# Patient Record
Sex: FEMALE | Race: WHITE | NOT HISPANIC OR LATINO | URBAN - METROPOLITAN AREA
[De-identification: names, ages, dates, MRNs, and addresses within clinical notes are randomized per-mention and may not be internally consistent; named-entity substitution may affect disease eponyms.]

---

## 2022-04-12 ENCOUNTER — INPATIENT (INPATIENT)
Facility: HOSPITAL | Age: 77
LOS: 1 days | Discharge: HOME | DRG: 384 | End: 2022-04-14
Attending: STUDENT IN AN ORGANIZED HEALTH CARE EDUCATION/TRAINING PROGRAM | Admitting: STUDENT IN AN ORGANIZED HEALTH CARE EDUCATION/TRAINING PROGRAM
Payer: MEDICARE

## 2022-04-12 VITALS
OXYGEN SATURATION: 97 % | DIASTOLIC BLOOD PRESSURE: 70 MMHG | WEIGHT: 139.99 LBS | TEMPERATURE: 98 F | RESPIRATION RATE: 16 BRPM | HEIGHT: 68 IN | HEART RATE: 108 BPM | SYSTOLIC BLOOD PRESSURE: 153 MMHG

## 2022-04-12 DIAGNOSIS — K92.2 GASTROINTESTINAL HEMORRHAGE, UNSPECIFIED: ICD-10-CM

## 2022-04-12 DIAGNOSIS — E03.9 HYPOTHYROIDISM, UNSPECIFIED: ICD-10-CM

## 2022-04-12 DIAGNOSIS — C50.919 MALIGNANT NEOPLASM OF UNSPECIFIED SITE OF UNSPECIFIED FEMALE BREAST: ICD-10-CM

## 2022-04-12 DIAGNOSIS — F41.9 ANXIETY DISORDER, UNSPECIFIED: ICD-10-CM

## 2022-04-12 DIAGNOSIS — D72.829 ELEVATED WHITE BLOOD CELL COUNT, UNSPECIFIED: ICD-10-CM

## 2022-04-12 DIAGNOSIS — N17.9 ACUTE KIDNEY FAILURE, UNSPECIFIED: ICD-10-CM

## 2022-04-12 DIAGNOSIS — I63.9 CEREBRAL INFARCTION, UNSPECIFIED: ICD-10-CM

## 2022-04-12 DIAGNOSIS — D64.9 ANEMIA, UNSPECIFIED: ICD-10-CM

## 2022-04-12 DIAGNOSIS — I10 ESSENTIAL (PRIMARY) HYPERTENSION: ICD-10-CM

## 2022-04-12 DIAGNOSIS — R63.8 OTHER SYMPTOMS AND SIGNS CONCERNING FOOD AND FLUID INTAKE: ICD-10-CM

## 2022-04-12 LAB
ALBUMIN SERPL ELPH-MCNC: 4 G/DL — SIGNIFICANT CHANGE UP (ref 3.3–5)
ALP SERPL-CCNC: 83 U/L — SIGNIFICANT CHANGE UP (ref 40–120)
ALT FLD-CCNC: 23 U/L — SIGNIFICANT CHANGE UP (ref 10–45)
ANION GAP SERPL CALC-SCNC: 11 MMOL/L — SIGNIFICANT CHANGE UP (ref 5–17)
APPEARANCE UR: CLEAR — SIGNIFICANT CHANGE UP
APTT BLD: 23.6 SEC — LOW (ref 27.5–35.5)
AST SERPL-CCNC: 19 U/L — SIGNIFICANT CHANGE UP (ref 10–40)
BACTERIA # UR AUTO: SIGNIFICANT CHANGE UP /HPF
BASOPHILS # BLD AUTO: 0.04 K/UL — SIGNIFICANT CHANGE UP (ref 0–0.2)
BASOPHILS NFR BLD AUTO: 0.3 % — SIGNIFICANT CHANGE UP (ref 0–2)
BILIRUB SERPL-MCNC: 0.2 MG/DL — SIGNIFICANT CHANGE UP (ref 0.2–1.2)
BILIRUB UR-MCNC: NEGATIVE — SIGNIFICANT CHANGE UP
BLD GP AB SCN SERPL QL: NEGATIVE — SIGNIFICANT CHANGE UP
BUN SERPL-MCNC: 38 MG/DL — HIGH (ref 7–23)
CALCIUM SERPL-MCNC: 9 MG/DL — SIGNIFICANT CHANGE UP (ref 8.4–10.5)
CHLORIDE SERPL-SCNC: 107 MMOL/L — SIGNIFICANT CHANGE UP (ref 96–108)
CO2 SERPL-SCNC: 23 MMOL/L — SIGNIFICANT CHANGE UP (ref 22–31)
COLOR SPEC: YELLOW — SIGNIFICANT CHANGE UP
COMMENT - URINE: SIGNIFICANT CHANGE UP
CREAT SERPL-MCNC: 1.37 MG/DL — HIGH (ref 0.5–1.3)
DIFF PNL FLD: ABNORMAL
EGFR: 40 ML/MIN/1.73M2 — LOW
EOSINOPHIL # BLD AUTO: 0.01 K/UL — SIGNIFICANT CHANGE UP (ref 0–0.5)
EOSINOPHIL NFR BLD AUTO: 0.1 % — SIGNIFICANT CHANGE UP (ref 0–6)
EPI CELLS # UR: SIGNIFICANT CHANGE UP /HPF (ref 0–5)
FERRITIN SERPL-MCNC: 41 NG/ML — SIGNIFICANT CHANGE UP (ref 15–150)
GLUCOSE SERPL-MCNC: 124 MG/DL — HIGH (ref 70–99)
GLUCOSE UR QL: NEGATIVE — SIGNIFICANT CHANGE UP
HCT VFR BLD CALC: 25.4 % — LOW (ref 34.5–45)
HGB BLD-MCNC: 8 G/DL — LOW (ref 11.5–15.5)
IMM GRANULOCYTES NFR BLD AUTO: 0.5 % — SIGNIFICANT CHANGE UP (ref 0–1.5)
INR BLD: 0.97 — SIGNIFICANT CHANGE UP (ref 0.88–1.16)
IRON SATN MFR SERPL: 10 % — LOW (ref 14–50)
IRON SATN MFR SERPL: 30 UG/DL — SIGNIFICANT CHANGE UP (ref 30–160)
KETONES UR-MCNC: NEGATIVE — SIGNIFICANT CHANGE UP
LEUKOCYTE ESTERASE UR-ACNC: ABNORMAL
LYMPHOCYTES # BLD AUTO: 1.9 K/UL — SIGNIFICANT CHANGE UP (ref 1–3.3)
LYMPHOCYTES # BLD AUTO: 12.6 % — LOW (ref 13–44)
MCHC RBC-ENTMCNC: 29.5 PG — SIGNIFICANT CHANGE UP (ref 27–34)
MCHC RBC-ENTMCNC: 31.5 GM/DL — LOW (ref 32–36)
MCV RBC AUTO: 93.7 FL — SIGNIFICANT CHANGE UP (ref 80–100)
MONOCYTES # BLD AUTO: 1.01 K/UL — HIGH (ref 0–0.9)
MONOCYTES NFR BLD AUTO: 6.7 % — SIGNIFICANT CHANGE UP (ref 2–14)
NEUTROPHILS # BLD AUTO: 12.08 K/UL — HIGH (ref 1.8–7.4)
NEUTROPHILS NFR BLD AUTO: 79.8 % — HIGH (ref 43–77)
NITRITE UR-MCNC: NEGATIVE — SIGNIFICANT CHANGE UP
NRBC # BLD: 0 /100 WBCS — SIGNIFICANT CHANGE UP (ref 0–0)
OB PNL STL: POSITIVE
PH UR: 6 — SIGNIFICANT CHANGE UP (ref 5–8)
PLATELET # BLD AUTO: 327 K/UL — SIGNIFICANT CHANGE UP (ref 150–400)
POTASSIUM SERPL-MCNC: 4.1 MMOL/L — SIGNIFICANT CHANGE UP (ref 3.5–5.3)
POTASSIUM SERPL-SCNC: 4.1 MMOL/L — SIGNIFICANT CHANGE UP (ref 3.5–5.3)
PROT SERPL-MCNC: 6.5 G/DL — SIGNIFICANT CHANGE UP (ref 6–8.3)
PROT UR-MCNC: NEGATIVE MG/DL — SIGNIFICANT CHANGE UP
PROTHROM AB SERPL-ACNC: 11.5 SEC — SIGNIFICANT CHANGE UP (ref 10.5–13.4)
RBC # BLD: 2.55 M/UL — LOW (ref 3.8–5.2)
RBC # BLD: 2.71 M/UL — LOW (ref 3.8–5.2)
RBC # FLD: 13.3 % — SIGNIFICANT CHANGE UP (ref 10.3–14.5)
RBC CASTS # UR COMP ASSIST: < 5 /HPF — SIGNIFICANT CHANGE UP
RETICS #: 46.9 K/UL — SIGNIFICANT CHANGE UP (ref 25–125)
RETICS/RBC NFR: 1.8 % — SIGNIFICANT CHANGE UP (ref 0.5–2.5)
RH IG SCN BLD-IMP: NEGATIVE — SIGNIFICANT CHANGE UP
SARS-COV-2 RNA SPEC QL NAA+PROBE: NEGATIVE — SIGNIFICANT CHANGE UP
SODIUM SERPL-SCNC: 141 MMOL/L — SIGNIFICANT CHANGE UP (ref 135–145)
SP GR SPEC: 1.01 — SIGNIFICANT CHANGE UP (ref 1–1.03)
TIBC SERPL-MCNC: 292 UG/DL — SIGNIFICANT CHANGE UP (ref 220–430)
TRANSFERRIN SERPL-MCNC: 244 MG/DL — SIGNIFICANT CHANGE UP (ref 200–360)
UIBC SERPL-MCNC: 262 UG/DL — SIGNIFICANT CHANGE UP (ref 110–370)
UROBILINOGEN FLD QL: 0.2 E.U./DL — SIGNIFICANT CHANGE UP
WBC # BLD: 15.11 K/UL — HIGH (ref 3.8–10.5)
WBC # FLD AUTO: 15.11 K/UL — HIGH (ref 3.8–10.5)
WBC UR QL: ABNORMAL /HPF

## 2022-04-12 PROCEDURE — 99285 EMERGENCY DEPT VISIT HI MDM: CPT | Mod: 25

## 2022-04-12 PROCEDURE — 71045 X-RAY EXAM CHEST 1 VIEW: CPT | Mod: 26

## 2022-04-12 PROCEDURE — 99223 1ST HOSP IP/OBS HIGH 75: CPT | Mod: GC

## 2022-04-12 RX ORDER — PANTOPRAZOLE SODIUM 20 MG/1
80 TABLET, DELAYED RELEASE ORAL ONCE
Refills: 0 | Status: COMPLETED | OUTPATIENT
Start: 2022-04-12 | End: 2022-04-12

## 2022-04-12 RX ORDER — BUPROPION HYDROCHLORIDE 150 MG/1
1 TABLET, EXTENDED RELEASE ORAL
Qty: 0 | Refills: 0 | DISCHARGE

## 2022-04-12 RX ORDER — PANTOPRAZOLE SODIUM 20 MG/1
40 TABLET, DELAYED RELEASE ORAL ONCE
Refills: 0 | Status: DISCONTINUED | OUTPATIENT
Start: 2022-04-12 | End: 2022-04-12

## 2022-04-12 RX ORDER — MIRTAZAPINE 45 MG/1
15 TABLET, ORALLY DISINTEGRATING ORAL DAILY
Refills: 0 | Status: DISCONTINUED | OUTPATIENT
Start: 2022-04-12 | End: 2022-04-14

## 2022-04-12 RX ORDER — PANTOPRAZOLE SODIUM 20 MG/1
8 TABLET, DELAYED RELEASE ORAL
Qty: 80 | Refills: 0 | Status: DISCONTINUED | OUTPATIENT
Start: 2022-04-12 | End: 2022-04-13

## 2022-04-12 RX ORDER — FLUDROCORTISONE ACETATE 0.1 MG/1
0.1 TABLET ORAL DAILY
Refills: 0 | Status: DISCONTINUED | OUTPATIENT
Start: 2022-04-13 | End: 2022-04-14

## 2022-04-12 RX ORDER — ATORVASTATIN CALCIUM 80 MG/1
80 TABLET, FILM COATED ORAL AT BEDTIME
Refills: 0 | Status: DISCONTINUED | OUTPATIENT
Start: 2022-04-12 | End: 2022-04-14

## 2022-04-12 RX ORDER — SODIUM CHLORIDE 9 MG/ML
500 INJECTION INTRAMUSCULAR; INTRAVENOUS; SUBCUTANEOUS ONCE
Refills: 0 | Status: COMPLETED | OUTPATIENT
Start: 2022-04-12 | End: 2022-04-12

## 2022-04-12 RX ORDER — LEVOTHYROXINE SODIUM 125 MCG
100 TABLET ORAL DAILY
Refills: 0 | Status: DISCONTINUED | OUTPATIENT
Start: 2022-04-12 | End: 2022-04-14

## 2022-04-12 RX ORDER — ACETAMINOPHEN 500 MG
650 TABLET ORAL EVERY 6 HOURS
Refills: 0 | Status: DISCONTINUED | OUTPATIENT
Start: 2022-04-12 | End: 2022-04-14

## 2022-04-12 RX ORDER — BUPROPION HYDROCHLORIDE 150 MG/1
300 TABLET, EXTENDED RELEASE ORAL DAILY
Refills: 0 | Status: DISCONTINUED | OUTPATIENT
Start: 2022-04-13 | End: 2022-04-14

## 2022-04-12 RX ADMIN — PANTOPRAZOLE SODIUM 10 MG/HR: 20 TABLET, DELAYED RELEASE ORAL at 21:10

## 2022-04-12 RX ADMIN — PANTOPRAZOLE SODIUM 80 MILLIGRAM(S): 20 TABLET, DELAYED RELEASE ORAL at 20:20

## 2022-04-12 RX ADMIN — SODIUM CHLORIDE 500 MILLILITER(S): 9 INJECTION INTRAMUSCULAR; INTRAVENOUS; SUBCUTANEOUS at 18:58

## 2022-04-12 NOTE — H&P ADULT - NSHPLABSRESULTS_GEN_ALL_CORE
LABS:                         8.0    15.11 )-----------( 327      ( 2022 18:30 )             25.4     04-12    141  |  107  |  38<H>  ----------------------------<  124<H>  4.1   |  23  |  1.37<H>    Ca    9.0      2022 18:31    TPro  6.5  /  Alb  4.0  /  TBili  0.2  /  DBili  x   /  AST  19  /  ALT  23  /  AlkPhos  83  04-12    PT/INR - ( 2022 18:30 )   PT: 11.5 sec;   INR: 0.97          PTT - ( 2022 18:30 )  PTT:23.6 sec  Urinalysis Basic - ( 2022 20:19 )    Color: Yellow / Appearance: Clear / S.010 / pH: x  Gluc: x / Ketone: NEGATIVE  / Bili: Negative / Urobili: 0.2 E.U./dL   Blood: x / Protein: NEGATIVE mg/dL / Nitrite: NEGATIVE   Leuk Esterase: Trace / RBC: x / WBC x   Sq Epi: x / Non Sq Epi: x / Bacteria: x                RADIOLOGY, EKG & ADDITIONAL TESTS: Reviewed.

## 2022-04-12 NOTE — H&P ADULT - NSHPSOCIALHISTORY_GEN_ALL_CORE
...... Heavy EtOH/ tobacco use in the past, quit bother 15+ years ago.   Denies illicit drug use hx   Lives at home w/ fam

## 2022-04-12 NOTE — H&P ADULT - PROBLEM SELECTOR PLAN 8
Home med: mirtazapine 15, Pristiq 50, Donepezil 10, Wellbutrin 150 or 300  -c/w home meds Home med: mirtazapine 15, Pristiq 50, Donepezil 10, Wellbutrin 300  -c/w home meds (Pristiq 50 not formulary will need to bring own med)

## 2022-04-12 NOTE — H&P ADULT - PROBLEM SELECTOR PLAN 7
Home med: Olmesartan 20  -hold ARB in the setting of ALYSHA, resume when can Home med: Olmesartan 20  -hold ARB in the setting of ALYSHA, GIB resume when can

## 2022-04-12 NOTE — H&P ADULT - PROBLEM SELECTOR PLAN 2
Hgb 8, MVC 90 on admission. Baseline Hgb unknown  -see above for plan Normocytic anemia. Hgb 8, MVC 90 on admission. Baseline Hgb unknown  -see above for plan

## 2022-04-12 NOTE — H&P ADULT - NSHPPHYSICALEXAM_GEN_ALL_CORE
VITAL SIGNS:  T(C): 36.6 (04-12-22 @ 20:43), Max: 37.1 (04-12-22 @ 19:10)  T(F): 97.9 (04-12-22 @ 20:43), Max: 98.7 (04-12-22 @ 19:10)  HR: 90 (04-12-22 @ 20:43) (90 - 108)  BP: 115/82 (04-12-22 @ 20:43) (113/70 - 153/70)  BP(mean): --  RR: 16 (04-12-22 @ 20:43) (16 - 16)  SpO2: 98% (04-12-22 @ 20:43) (97% - 98%)  Wt(kg): --    PHYSICAL EXAM:  Constitutional: WDWN resting comfortably in bed; NAD  Head: NC/AT  Eyes: PERRL, EOMI, anicteric sclera  ENT: no nasal discharge; uvula midline, no oropharyngeal erythema or exudates; MMM  Neck: supple; no JVD or thyromegaly  Respiratory: CTA B/L; no W/R/R, no retractions  Cardiac: +S1/S2; RRR; no M/R/G; PMI non-displaced  Gastrointestinal: abdomen soft, NT/ND; no rebound or guarding; +BSx4  Back: spine midline, no bony tenderness or step-offs; no CVAT B/L  Extremities: WWP, no clubbing or cyanosis; no peripheral edema  Musculoskeletal: NROM x4; no joint swelling, tenderness or erythema  Vascular: 2+ radial, femoral, DP/PT pulses B/L  Dermatologic: skin warm, dry and intact; no rashes, wounds, or scars  Lymphatic: no submandibular or cervical LAD  Neurologic: AAOx3; CNII-XII grossly intact; no focal deficits  Psychiatric: affect and characteristics of appearance, verbalizations, behaviors are appropriate VITAL SIGNS:  T(C): 36.6 (04-12-22 @ 20:43), Max: 37.1 (04-12-22 @ 19:10)  T(F): 97.9 (04-12-22 @ 20:43), Max: 98.7 (04-12-22 @ 19:10)  HR: 90 (04-12-22 @ 20:43) (90 - 108)  BP: 115/82 (04-12-22 @ 20:43) (113/70 - 153/70)  BP(mean): --  RR: 16 (04-12-22 @ 20:43) (16 - 16)  SpO2: 98% (04-12-22 @ 20:43) (97% - 98%)  Wt(kg): --    PHYSICAL EXAM:  Constitutional: NAD, pale   ENT: MMM  Respiratory: CTA B/L; no W/R/R   Cardiac: +S1/S2; RRR; no M/R/G   Gastrointestinal: abdomen soft, NT/ND; no rebound or guarding; +BSx4  Extremities: WWP, no clubbing or cyanosis; no peripheral edema  Vascular: 2+ radial, femoral, DP/PT pulses B/L  Neurologic: AAOx3; no focal deficits

## 2022-04-12 NOTE — H&P ADULT - ATTENDING COMMENTS
#Anemia: hgb 8 on admission, baseline 12. Denies symptoms of bleeded. Rectal exam w/ brown stool, however FOBT+. Currently hds, f/up orthostatics, lactate. GI consulted in ED, recc ppi ggt, npo for now, 2 large bore ivs, f.up iron panel

## 2022-04-12 NOTE — H&P ADULT - ASSESSMENT
76F PMH CVA (no residual deficits hypothyroidism, HTN, HLD, Anxiety/Depression and breast CA (s/p lumpectomy 15 years ago, not on rx)  presents from outpt  service ([facility]) for evaluation of anemia, ALYSHA in the setting of possible GI bleed.

## 2022-04-12 NOTE — ED PROVIDER NOTE - OBJECTIVE STATEMENT
77 yo PMhx of CVA, hypothyroidism, hdl, breast CA w complaints of 2 days 75 yo PMhx of CVA, hypothyroidism, hdl, breast CA w complaints of 2 days of fatigue, weakness, n/v. Went to outpt  service that completed labs, CT showing evidence of anemia, PUD/gastritis and referred to ED. Pt currently on asa/plavix, denies sob, chest pain, leg swelling, GI bleed sx. Last colonoscopy 5 years ago normal, never had endoscopy. No cough, f/c, abd pain. Prior heavy cigarette use/etoh use. PT denies current/recent etoh use.

## 2022-04-12 NOTE — ED ADULT TRIAGE NOTE - CHIEF COMPLAINT QUOTE
Pt brought in From [place] for possible GI bleed. Pt reports abdominal pain, nausea, and vomiting x2 days. As per patient, "they took a sample and said there is blood in my stool." Denies fevers, chills.

## 2022-04-12 NOTE — H&P ADULT - PROBLEM SELECTOR PLAN 3
WBC 15. CXR-, UA -. Most likely refractive in the setting of GIB   -c/t trend   -no abx at this time WBC 15. CXR-, UA -. Most likely refractive in the setting of possible slow GIB and/or recent viral gastroenteritis (pt w/ NBNB diarrhea/emesis X 2 days, that self resolved, last episodes 4/11 am)    -c/t trend   -no abx at this time

## 2022-04-12 NOTE — H&P ADULT - PROBLEM SELECTOR PLAN 4
Cr 1.37. Baseline Cr unknown. Most likely prerenal in the setting of slow GI bleed, and n/v  -f/u urine lytes   -avoid nephrotoxins, renally dose meds Cr 1.37. Baseline Cr unknown. Most likely prerenal in the setting of slow GI bleed and/or recent viral gastroenteritis (pt w/ NBNB diarrhea/emesis X 2 days, that self resolved, last episodes 4/11 am)    -f/u urine lytes   -avoid nephrotoxins, renally dose meds

## 2022-04-12 NOTE — H&P ADULT - NSICDXPASTMEDICALHX_GEN_ALL_CORE_FT
PAST MEDICAL HISTORY:  Breast CA     Cerebrovascular accident (CVA)     Depressive disorder Depression    HLD (hyperlipidemia)     Hypothyroidism Hypothyroidism

## 2022-04-12 NOTE — ED PROVIDER NOTE - PHYSICAL EXAMINATION

## 2022-04-12 NOTE — H&P ADULT - HISTORY OF PRESENT ILLNESS
76F PMH CVA, hypothyroidism, HTN, HLD, Anxiety/Depression and breast CA (not on rx)  presents c/o complaints of 2 days of fatigue, weakness, n/v. Went to outpt  service that completed labs, CT showing evidence of anemia, PUD/gastritis and referred to ED. Pt currently on asa/plavix, denies sob, chest pain, leg swelling, GI bleed sx. Last colonoscopy 5 years ago normal, never had endoscopy. No cough, f/c, abd pain. Prior heavy cigarette use/etoh use. PT denies current/recent etoh use    ED Course:   VS: 98.3, 109, 153/70, 16 98 RA  Labs: WBC 15, Hgb 8, MCV 93, BUN 38, Cr 1.37, COVID -  Imaging: CXR: No acute pathology; EKG: Sinus Tach, QTc 452  Rx: Protonix IV 80,  76F PMH CVA (no residual deficits hypothyroidism, HTN, HLD, Anxiety/Depression and breast CA (s/p lumpectomy 15 years ago, not on rx)  presents from outpt  service ([facility]) for evaluation of anemia, ALYSHA in the setting of possible GI bleed. Pt reports fatigue, weakness, n/v/d X 2 days. Had 2-3 episodes of NBNB diarrhea and NBNB emesis X 2 days that have self resolved now, last episode of diarrhea/emesis was on 4/11 am. Does not recall eating anything unusual, sick contacts and recent travel hx. Pt went [place] [facility] [place] of weakness/ fatigue w/ labs reveling anemia, +FOBT and ? CT A/P (pt not sure of findings)  and was sent to ED for further eval. Denies hx of GIB requiring transfusions.  Pt currently on ASA/plavix, denies melena, hematemesis, hematochezia, BRBPR, sob, chest pain, f/c, abd pain and dysuria. Last colonoscopy 5 years ago normal, never had endoscopy. Denies current NSIAD, EtOH and tobacco use. Used to be a heavy cigarette / EtoH user in the past, reports abstinence for 15+ years now. Reports last screenings were done 3 years ago and were normal at that time. Denies any recent weight lose, f/c, changes in bowel/ bladder habits and night sweats.     ED Course:   VS: 98.3, 109, 153/70, 16 98 RA  Labs: WBC 15, Hgb 8, MCV 93, BUN 38, Cr 1.37, COVID -  Imaging: CXR: No acute pathology; EKG: Sinus Tach, QTc 452  Rx: Protonix IV 80,

## 2022-04-12 NOTE — ED ADULT NURSE NOTE - OBJECTIVE STATEMENT
Pt AOX4. Pt n/v x2 days. Pt states "I was at [employer] and they said there was blood in my stool sample and referred me to the ER for possible GI bleed". Pt denies fevers, chills, SOB, chest pain, abdominal pain, hematuria, dysuria. Endorses decreased appetite. Pt speaking in full complete sentences. Respirations even and unlabored. Pt ambulates steadily.

## 2022-04-12 NOTE — ED ADULT NURSE NOTE - CHIEF COMPLAINT QUOTE
Pt brought in From [place] for possible GI bleed. Pt reports abdominal pain, nausea, and vomiting x2 days. As per patient, "they took a sample and said there is blood in my stool." Denies fevers, chills.

## 2022-04-12 NOTE — ED ADULT NURSE NOTE - CAS EDP DISCH DISPOSITION ADMI
er holding pending bed/MedSurg er holding pending [facility][id number][facility]

## 2022-04-12 NOTE — H&P ADULT - PROBLEM SELECTOR PLAN 1
-Sinus Tach 110s, BP stable   -Hgb 8, MCV 93, BUN 38, FOBT +  -GI consulted in the ED    Plan:   -PPI gtt   -Active TandS, transfuse for Hgb <7   -f/u Iron panel, Folate, B12, Retic count  -NPO for possible procedure   -f/u GI recs Pt went to [facility] [place] today for evaluation of weakness/ fatigue w/ labs reveling anemia, +FOBT and ? CT A/P fining of bleed (pt not sure of findings)  and was sent to ED for further eval. Denies hx of GIB requiring transfusions.  Pt currently on ASA/plavix, denies melena, hematemesis, hematochezia, BRBPR. Last colonoscopy 5 years ago normal, never had endoscopy. Denies current NSIAD, EtOH and tobacco use. Not UTD on screenings (pt w/ hx of Breast CA, s/p Lumpectomy 15 years ago). Denies B symptoms.   -Sinus Tach 110s, BP stable   -Hgb 8, MCV 93, BUN 38, FOBT +  -GI consulted in the ED  DDX: Slow GI bleed (PUD, gastritis, esophagitis) vs Malignancy vs Nutritional def vs Bone Marrow suppression      Plan:   -PPI gtt   -Active TandS, transfuse for Hgb <7   -f/u Iron panel, Folate, B12, Retic count  -NPO for possible procedure   -f/u GI recs Pt went to [facility] [place] evaluation of weakness/ fatigue w/ labs reveling anemia, +FOBT and ? CT A/P fining of bleed (pt not sure of findings)  and was sent to ED for further eval. Denies hx of GIB requiring transfusions.  Pt currently on ASA/plavix, denies melena, hematemesis, hematochezia, BRBPR. Last colonoscopy 5 years ago normal, never had endoscopy. Denies current NSIAD, EtOH and tobacco use. Not UTD on screenings (pt w/ hx of Breast CA, s/p Lumpectomy 15 years ago). Denies B symptoms.   -Sinus Tach 110s, BP stable   -Hgb 8, MCV 93, BUN 38, FOBT +  -GI consulted in the ED  DDX: Slow GI bleed (PUD, gastritis, esophagitis) vs Malignancy vs Nutritional def vs Bone Marrow suppression      Plan:   -Maintain 2 large bore IVs  -PPI gtt   -Active TandS, transfuse for Hgb <7   -f/u Iron panel, Folate, B12, Retic count  -NPO for possible procedure   -f/u GI recs

## 2022-04-12 NOTE — ED ADULT NURSE REASSESSMENT NOTE - NS ED NURSE REASSESS COMMENT FT1
Received report from TADEO Napier. Received patient in stretcher. AOX4. VSS.  Patient denies chest pain, pain, discomfort, shortness of breath, difficulty breathing and any form of distress not noted. Patient oriented to ED area. Plan of care discussed and verbalized understanding. All needs attended. Fall risk precautions maintained. Purposeful proactive hourly rounding in progress.

## 2022-04-12 NOTE — ED PROVIDER NOTE - CLINICAL SUMMARY MEDICAL DECISION MAKING FREE TEXT BOX
Pt w complaints of fatigue, weakness, noted anemia/elev wbc, ulcers on outpt CT  Ddx: slow upper GI bleed, pna/uri/uti/dehydration  -labs, ivf, cxr, UA  Disc w  GI fellow, possible scope, outpt records reviewed in chart

## 2022-04-12 NOTE — H&P ADULT - PROBLEM SELECTOR PLAN 6
No residual deficits. Home med: ASA, Atorvastatin 80, Plavix 75  -hold ASA in the setting of GIB and possible scoping in am   -c/w Plavix and Statin No residual deficits. Home med: ASA, Atorvastatin 80, Plavix 75  -hold ASA, Plavix in the setting of GIB and possible scoping in am   -c/w Statin

## 2022-04-13 ENCOUNTER — RESULT REVIEW (OUTPATIENT)
Age: 77
End: 2022-04-13

## 2022-04-13 ENCOUNTER — TRANSCRIPTION ENCOUNTER (OUTPATIENT)
Age: 77
End: 2022-04-13

## 2022-04-13 LAB
ALBUMIN SERPL ELPH-MCNC: 3.7 G/DL — SIGNIFICANT CHANGE UP (ref 3.3–5)
ALP SERPL-CCNC: 82 U/L — SIGNIFICANT CHANGE UP (ref 40–120)
ALT FLD-CCNC: 21 U/L — SIGNIFICANT CHANGE UP (ref 10–45)
ANION GAP SERPL CALC-SCNC: 10 MMOL/L — SIGNIFICANT CHANGE UP (ref 5–17)
ANION GAP SERPL CALC-SCNC: 6 MMOL/L — SIGNIFICANT CHANGE UP (ref 5–17)
AST SERPL-CCNC: 21 U/L — SIGNIFICANT CHANGE UP (ref 10–40)
BASOPHILS # BLD AUTO: 0.04 K/UL — SIGNIFICANT CHANGE UP (ref 0–0.2)
BASOPHILS NFR BLD AUTO: 0.4 % — SIGNIFICANT CHANGE UP (ref 0–2)
BILIRUB SERPL-MCNC: 0.3 MG/DL — SIGNIFICANT CHANGE UP (ref 0.2–1.2)
BLD GP AB SCN SERPL QL: NEGATIVE — SIGNIFICANT CHANGE UP
BUN SERPL-MCNC: 24 MG/DL — HIGH (ref 7–23)
BUN SERPL-MCNC: 29 MG/DL — HIGH (ref 7–23)
CALCIUM SERPL-MCNC: 8.2 MG/DL — LOW (ref 8.4–10.5)
CALCIUM SERPL-MCNC: 8.8 MG/DL — SIGNIFICANT CHANGE UP (ref 8.4–10.5)
CHLORIDE SERPL-SCNC: 108 MMOL/L — SIGNIFICANT CHANGE UP (ref 96–108)
CHLORIDE SERPL-SCNC: 110 MMOL/L — HIGH (ref 96–108)
CO2 SERPL-SCNC: 22 MMOL/L — SIGNIFICANT CHANGE UP (ref 22–31)
CO2 SERPL-SCNC: 28 MMOL/L — SIGNIFICANT CHANGE UP (ref 22–31)
CREAT ?TM UR-MCNC: 104 MG/DL — SIGNIFICANT CHANGE UP
CREAT SERPL-MCNC: 1.35 MG/DL — HIGH (ref 0.5–1.3)
CREAT SERPL-MCNC: 1.51 MG/DL — HIGH (ref 0.5–1.3)
EGFR: 36 ML/MIN/1.73M2 — LOW
EGFR: 41 ML/MIN/1.73M2 — LOW
EOSINOPHIL # BLD AUTO: 0.08 K/UL — SIGNIFICANT CHANGE UP (ref 0–0.5)
EOSINOPHIL NFR BLD AUTO: 0.8 % — SIGNIFICANT CHANGE UP (ref 0–6)
FOLATE SERPL-MCNC: 4.3 NG/ML — LOW
GLUCOSE SERPL-MCNC: 103 MG/DL — HIGH (ref 70–99)
GLUCOSE SERPL-MCNC: 98 MG/DL — SIGNIFICANT CHANGE UP (ref 70–99)
HAPTOGLOB SERPL-MCNC: 157 MG/DL — SIGNIFICANT CHANGE UP (ref 34–200)
HCT VFR BLD CALC: 22.2 % — LOW (ref 34.5–45)
HCT VFR BLD CALC: 22.8 % — LOW (ref 34.5–45)
HCT VFR BLD CALC: 25 % — LOW (ref 34.5–45)
HCV AB S/CO SERPL IA: 0.04 S/CO — SIGNIFICANT CHANGE UP
HCV AB SERPL-IMP: SIGNIFICANT CHANGE UP
HGB BLD-MCNC: 6.4 G/DL — CRITICAL LOW (ref 11.5–15.5)
HGB BLD-MCNC: 7.1 G/DL — LOW (ref 11.5–15.5)
HGB BLD-MCNC: 7.6 G/DL — LOW (ref 11.5–15.5)
IMM GRANULOCYTES NFR BLD AUTO: 0.3 % — SIGNIFICANT CHANGE UP (ref 0–1.5)
LDH SERPL L TO P-CCNC: 167 U/L — SIGNIFICANT CHANGE UP (ref 50–242)
LYMPHOCYTES # BLD AUTO: 2.39 K/UL — SIGNIFICANT CHANGE UP (ref 1–3.3)
LYMPHOCYTES # BLD AUTO: 25.3 % — SIGNIFICANT CHANGE UP (ref 13–44)
MAGNESIUM SERPL-MCNC: 1.7 MG/DL — SIGNIFICANT CHANGE UP (ref 1.6–2.6)
MCHC RBC-ENTMCNC: 28.8 GM/DL — LOW (ref 32–36)
MCHC RBC-ENTMCNC: 28.8 PG — SIGNIFICANT CHANGE UP (ref 27–34)
MCHC RBC-ENTMCNC: 29.2 PG — SIGNIFICANT CHANGE UP (ref 27–34)
MCHC RBC-ENTMCNC: 29.3 PG — SIGNIFICANT CHANGE UP (ref 27–34)
MCHC RBC-ENTMCNC: 30.4 GM/DL — LOW (ref 32–36)
MCHC RBC-ENTMCNC: 31.1 GM/DL — LOW (ref 32–36)
MCV RBC AUTO: 100 FL — SIGNIFICANT CHANGE UP (ref 80–100)
MCV RBC AUTO: 94.2 FL — SIGNIFICANT CHANGE UP (ref 80–100)
MCV RBC AUTO: 96.2 FL — SIGNIFICANT CHANGE UP (ref 80–100)
MONOCYTES # BLD AUTO: 1 K/UL — HIGH (ref 0–0.9)
MONOCYTES NFR BLD AUTO: 10.6 % — SIGNIFICANT CHANGE UP (ref 2–14)
NEUTROPHILS # BLD AUTO: 5.91 K/UL — SIGNIFICANT CHANGE UP (ref 1.8–7.4)
NEUTROPHILS NFR BLD AUTO: 62.6 % — SIGNIFICANT CHANGE UP (ref 43–77)
NRBC # BLD: 0 /100 WBCS — SIGNIFICANT CHANGE UP (ref 0–0)
OSMOLALITY UR: 609 MOSM/KG — SIGNIFICANT CHANGE UP (ref 300–900)
PHOSPHATE SERPL-MCNC: 3.3 MG/DL — SIGNIFICANT CHANGE UP (ref 2.5–4.5)
PLATELET # BLD AUTO: 205 K/UL — SIGNIFICANT CHANGE UP (ref 150–400)
PLATELET # BLD AUTO: 269 K/UL — SIGNIFICANT CHANGE UP (ref 150–400)
PLATELET # BLD AUTO: 292 K/UL — SIGNIFICANT CHANGE UP (ref 150–400)
POTASSIUM SERPL-MCNC: 4.4 MMOL/L — SIGNIFICANT CHANGE UP (ref 3.5–5.3)
POTASSIUM SERPL-MCNC: 4.7 MMOL/L — SIGNIFICANT CHANGE UP (ref 3.5–5.3)
POTASSIUM SERPL-SCNC: 4.4 MMOL/L — SIGNIFICANT CHANGE UP (ref 3.5–5.3)
POTASSIUM SERPL-SCNC: 4.7 MMOL/L — SIGNIFICANT CHANGE UP (ref 3.5–5.3)
PROT SERPL-MCNC: 5.9 G/DL — LOW (ref 6–8.3)
RBC # BLD: 2.22 M/UL — LOW (ref 3.8–5.2)
RBC # BLD: 2.42 M/UL — LOW (ref 3.8–5.2)
RBC # BLD: 2.6 M/UL — LOW (ref 3.8–5.2)
RBC # FLD: 13.2 % — SIGNIFICANT CHANGE UP (ref 10.3–14.5)
RBC # FLD: 13.4 % — SIGNIFICANT CHANGE UP (ref 10.3–14.5)
RBC # FLD: 13.5 % — SIGNIFICANT CHANGE UP (ref 10.3–14.5)
RH IG SCN BLD-IMP: NEGATIVE — SIGNIFICANT CHANGE UP
SODIUM SERPL-SCNC: 142 MMOL/L — SIGNIFICANT CHANGE UP (ref 135–145)
SODIUM SERPL-SCNC: 142 MMOL/L — SIGNIFICANT CHANGE UP (ref 135–145)
SODIUM UR-SCNC: 79 MMOL/L — SIGNIFICANT CHANGE UP
T4 FREE SERPL-MCNC: 0.97 NG/DL — SIGNIFICANT CHANGE UP (ref 0.93–1.7)
TSH SERPL-MCNC: 4.4 UIU/ML — HIGH (ref 0.27–4.2)
VIT B12 SERPL-MCNC: 827 PG/ML — SIGNIFICANT CHANGE UP (ref 232–1245)
WBC # BLD: 12.67 K/UL — HIGH (ref 3.8–10.5)
WBC # BLD: 7.51 K/UL — SIGNIFICANT CHANGE UP (ref 3.8–10.5)
WBC # BLD: 9.45 K/UL — SIGNIFICANT CHANGE UP (ref 3.8–10.5)
WBC # FLD AUTO: 12.67 K/UL — HIGH (ref 3.8–10.5)
WBC # FLD AUTO: 7.51 K/UL — SIGNIFICANT CHANGE UP (ref 3.8–10.5)
WBC # FLD AUTO: 9.45 K/UL — SIGNIFICANT CHANGE UP (ref 3.8–10.5)

## 2022-04-13 PROCEDURE — 88305 TISSUE EXAM BY PATHOLOGIST: CPT | Mod: 26

## 2022-04-13 PROCEDURE — 74177 CT ABD & PELVIS W/CONTRAST: CPT | Mod: 26

## 2022-04-13 PROCEDURE — 88342 IMHCHEM/IMCYTCHM 1ST ANTB: CPT | Mod: 26

## 2022-04-13 PROCEDURE — 43239 EGD BIOPSY SINGLE/MULTIPLE: CPT

## 2022-04-13 PROCEDURE — 88341 IMHCHEM/IMCYTCHM EA ADD ANTB: CPT | Mod: 26

## 2022-04-13 PROCEDURE — 99222 1ST HOSP IP/OBS MODERATE 55: CPT | Mod: 25

## 2022-04-13 RX ORDER — DONEPEZIL HYDROCHLORIDE 10 MG/1
5 TABLET, FILM COATED ORAL AT BEDTIME
Refills: 0 | Status: DISCONTINUED | OUTPATIENT
Start: 2022-04-13 | End: 2022-04-14

## 2022-04-13 RX ORDER — ACETAMINOPHEN 500 MG
650 TABLET ORAL ONCE
Refills: 0 | Status: DISCONTINUED | OUTPATIENT
Start: 2022-04-13 | End: 2022-04-13

## 2022-04-13 RX ORDER — POLYETHYLENE GLYCOL 3350 17 G/17G
17 POWDER, FOR SOLUTION ORAL
Refills: 0 | Status: DISCONTINUED | OUTPATIENT
Start: 2022-04-13 | End: 2022-04-13

## 2022-04-13 RX ORDER — SODIUM CHLORIDE 9 MG/ML
1000 INJECTION INTRAMUSCULAR; INTRAVENOUS; SUBCUTANEOUS
Refills: 0 | Status: DISCONTINUED | OUTPATIENT
Start: 2022-04-13 | End: 2022-04-14

## 2022-04-13 RX ORDER — LANOLIN ALCOHOL/MO/W.PET/CERES
3 CREAM (GRAM) TOPICAL ONCE
Refills: 0 | Status: DISCONTINUED | OUTPATIENT
Start: 2022-04-13 | End: 2022-04-13

## 2022-04-13 RX ORDER — PANTOPRAZOLE SODIUM 20 MG/1
40 TABLET, DELAYED RELEASE ORAL EVERY 12 HOURS
Refills: 0 | Status: DISCONTINUED | OUTPATIENT
Start: 2022-04-13 | End: 2022-04-14

## 2022-04-13 RX ORDER — MAGNESIUM SULFATE 500 MG/ML
2 VIAL (ML) INJECTION ONCE
Refills: 0 | Status: COMPLETED | OUTPATIENT
Start: 2022-04-13 | End: 2022-04-13

## 2022-04-13 RX ORDER — FOLIC ACID 0.8 MG
1 TABLET ORAL DAILY
Refills: 0 | Status: DISCONTINUED | OUTPATIENT
Start: 2022-04-13 | End: 2022-04-14

## 2022-04-13 RX ORDER — DESVENLAFAXINE 50 MG/1
50 TABLET, EXTENDED RELEASE ORAL EVERY 24 HOURS
Refills: 0 | Status: DISCONTINUED | OUTPATIENT
Start: 2022-04-13 | End: 2022-04-14

## 2022-04-13 RX ORDER — IOHEXOL 300 MG/ML
30 INJECTION, SOLUTION INTRAVENOUS ONCE
Refills: 0 | Status: COMPLETED | OUTPATIENT
Start: 2022-04-13 | End: 2022-04-13

## 2022-04-13 RX ADMIN — SODIUM CHLORIDE 100 MILLILITER(S): 9 INJECTION INTRAMUSCULAR; INTRAVENOUS; SUBCUTANEOUS at 07:08

## 2022-04-13 RX ADMIN — PANTOPRAZOLE SODIUM 40 MILLIGRAM(S): 20 TABLET, DELAYED RELEASE ORAL at 14:29

## 2022-04-13 RX ADMIN — FLUDROCORTISONE ACETATE 0.1 MILLIGRAM(S): 0.1 TABLET ORAL at 05:51

## 2022-04-13 RX ADMIN — MIRTAZAPINE 15 MILLIGRAM(S): 45 TABLET, ORALLY DISINTEGRATING ORAL at 14:30

## 2022-04-13 RX ADMIN — PANTOPRAZOLE SODIUM 10 MG/HR: 20 TABLET, DELAYED RELEASE ORAL at 08:01

## 2022-04-13 RX ADMIN — PANTOPRAZOLE SODIUM 40 MILLIGRAM(S): 20 TABLET, DELAYED RELEASE ORAL at 23:28

## 2022-04-13 RX ADMIN — Medication 100 MICROGRAM(S): at 05:51

## 2022-04-13 RX ADMIN — Medication 25 GRAM(S): at 14:31

## 2022-04-13 RX ADMIN — IOHEXOL 30 MILLILITER(S): 300 INJECTION, SOLUTION INTRAVENOUS at 21:00

## 2022-04-13 RX ADMIN — BUPROPION HYDROCHLORIDE 300 MILLIGRAM(S): 150 TABLET, EXTENDED RELEASE ORAL at 14:30

## 2022-04-13 RX ADMIN — DONEPEZIL HYDROCHLORIDE 5 MILLIGRAM(S): 10 TABLET, FILM COATED ORAL at 23:28

## 2022-04-13 NOTE — PROGRESS NOTE ADULT - ASSESSMENT
76F PMH CVA (no residual deficits hypothyroidism, HTN, HLD, Anxiety/Depression and breast CA (s/p lumpectomy 15 years ago, not on rx)  presents from outpt  service ([facility]) for evaluation of anemia, ALYSHA in the setting of possible GI bleed.

## 2022-04-13 NOTE — PROGRESS NOTE ADULT - PROBLEM SELECTOR PLAN 4
Cr 1.37. Baseline Cr unknown. Most likely prerenal in the setting of slow GI bleed and/or recent viral gastroenteritis (pt w/ NBNB diarrhea/emesis X 2 days, that self resolved, last episodes 4/11 am)    -f/u urine lytes   -avoid nephrotoxins, renally dose meds WBC 15. CXR-, UA -. Most likely refractive in the setting of possible slow GIB and/or recent viral gastroenteritis (pt w/ NBNB diarrhea/emesis X 2 days, that self resolved, last episodes 4/11 am) now resolved.  -c/t trend   -no abx at this time

## 2022-04-13 NOTE — PROGRESS NOTE ADULT - PROBLEM SELECTOR PLAN 8
Home med: mirtazapine 15, Pristiq 50, Donepezil 10, Wellbutrin 300  -c/w home meds (Pristiq 50 not formulary will need to bring own med)

## 2022-04-13 NOTE — PROGRESS NOTE ADULT - PROBLEM SELECTOR PLAN 1
Presents from outpatient provider w fatigue after 2 days of N/V diarrhea, found to have hgb 8, did CT showing mucosal inflamation. Denies hx of GIB requiring transfusions.  Pt currently on ASA/plavix, denies melena, hematemesis, hematochezia, BRBPR. Last colonoscopy 5 years ago normal, never had endoscopy. Denies current NSIAD, EtOH and tobacco use. Not UTD on screenings (pt w/ hx of Breast CA, s/p Lumpectomy 15 years ago). Denies B symptoms.   -Sinus Tach 110s, BP stable   -Hgb 8, MCV 93, BUN 38, FOBT +  -GI consulted in the ED  DDX: Slow GI bleed (PUD, gastritis, esophagitis) vs Malignancy vs Nutritional def vs Bone Marrow suppression      Plan:   -Maintain 2 large bore IVs  -PPI gtt   -Active TandS, transfuse for Hgb <7   -f/u Iron panel, Folate, B12, Retic count  -NPO for possible procedure   -f/u GI recs Presents from outpatient provider w fatigue after 2 days of N/V diarrhea, found to have hgb 8, did CT showing mucosal inflammation of gastric antrum and suspicious for ulcer. Diverticulosis but no diverticulitis. Denies hx of GIB requiring transfusions. Pt currently on ASA/plavix, denies melena, hematemesis, hematochezia, BRBPR. Last colonoscopy 5 years ago normal, never had endoscopy. Denies current NSAID, EtOH and tobacco use. Not UTD on screenings (pt w/ hx of Breast CA, s/p Lumpectomy 15 years ago). Denies B symptoms. Pt tachy but HD stable. Hgb 8-->7.6-->7.1. FOBT+. GI consulted. Elevated BUN. Iron panel showing low iron saturation. Retic index 0.49, low  DDX: Most likely GI bleed (PUD vs gastritis) vs Malignancy Less likely bone marrow suppression, nutritional def, hemolysis.   -Maintain 2 large bore IVs  -PPI gtt   -Active TandS, transfuse for Hgb <7   -NPO for possible scope

## 2022-04-13 NOTE — CONSULT NOTE ADULT - SUBJECTIVE AND OBJECTIVE BOX
HPI:  76F PMH CVA (no residual deficits hypothyroidism, HTN, HLD, Anxiety/Depression and breast CA (s/p lumpectomy 15 years ago, not on rx)  presents from outpt  service ([facility]) for evaluation of anemia, ALYSHA in the setting of possible GI bleed. Pt reports fatigue, weakness, n/v/d X 2 days. Had 2-3 episodes of NBNB diarrhea and NBNB emesis X 2 days that have self resolved now, last episode of diarrhea/emesis was on 4/11 am. Does not recall eating anything unusual, sick contacts and recent travel hx. Pt went to [facility] today for evaluation of weakness/ fatigue w/ labs reveling anemia, +FOBT and ? CT A/P (pt not sure of findings)  and was sent to ED for further eval. Denies hx of GIB requiring transfusions.  Pt currently on ASA/plavix, denies melena, hematemesis, hematochezia, BRBPR, sob, chest pain, f/c, abd pain and dysuria. Last colonoscopy 5 years ago normal, never had endoscopy. Denies current NSIAD, EtOH and tobacco use. Used to be a heavy cigarette / EtoH user in the past, reports abstinence for 15+ years now. Reports last screenings were done 3 years ago and were normal at that time. Denies any recent weight lose, f/c, changes in bowel/ bladder habits and night sweats.     ED Course:   VS: 98.3, 109, 153/70, 16 98 RA  Labs: WBC 15, Hgb 8, MCV 93, BUN 38, Cr 1.37, COVID -  Imaging: CXR: No acute pathology; EKG: Sinus Tach, QTc 452  Rx: Protonix IV 80,  (12 Apr 2022 20:39)    Allergies    No Known Allergies    Intolerances      MEDICATIONS:  MEDICATIONS  (STANDING):  atorvastatin 80 milliGRAM(s) Oral at bedtime  buPROPion XL (24-Hour) . 300 milliGRAM(s) Oral daily  donepezil 5 milliGRAM(s) Oral at bedtime  fludroCORTISONE 0.1 milliGRAM(s) Oral daily  levothyroxine 100 MICROGram(s) Oral daily  magnesium sulfate  IVPB 2 Gram(s) IV Intermittent once  mirtazapine 15 milliGRAM(s) Oral daily  pantoprazole Infusion 8 mG/Hr (10 mL/Hr) IV Continuous <Continuous>  sodium chloride 0.9%. 1000 milliLiter(s) (100 mL/Hr) IV Continuous <Continuous>    MEDICATIONS  (PRN):  acetaminophen     Tablet .. 650 milliGRAM(s) Oral every 6 hours PRN Temp greater or equal to 38C (100.4F), Mild Pain (1 - 3)    PAST MEDICAL & SURGICAL HISTORY:  Hypothyroidism  Hypothyroidism    Depressive disorder  Depression    Cerebrovascular accident (CVA)    HLD (hyperlipidemia)    Breast CA      FAMILY HISTORY:    SOCIAL HISTORY:  Tobacoo: [ ] Current, [ ] Former, [ ] Never; Pack Years:  Alcohol:  Illicit Drugs:    REVIEW OF SYSTEMS:  Constitutional: No fever, chills, weight loss, or fatigue  ENMT:  No visual changes; No difficulty hearing, tinnitus, vertigo; No sinus or throat pain  Neck: No pain or stiffness  Respiratory: No cough, wheezing, or hemoptysis; No shortness of breath  Cardiovascular: No chest pain, palpitations, dizziness, orthopnea, PND, or leg swelling  Gastrointestinal: No abdominal or epigastric pain. No  nausea, vomiting, or hematemesis. No diarrhea, constipation, or steatorrhea. No melena or hematochezia  Genitourinary: No dysuria, urinary frequency/hesitancy, or hematuria  Skin: No itching, burning, rashes or lesions   Musculoskeletal: No joint pain or swelling; No muscle, back or extremity pain    Vital Signs Last 24 Hrs  T(C): 36.9 (13 Apr 2022 05:51), Max: 37.8 (13 Apr 2022 01:34)  T(F): 98.4 (13 Apr 2022 05:51), Max: 100.1 (13 Apr 2022 01:34)  HR: 99 (13 Apr 2022 05:51) (90 - 108)  BP: 107/62 (13 Apr 2022 05:51) (100/55 - 153/70)  BP(mean): --  RR: 17 (13 Apr 2022 05:51) (16 - 18)  SpO2: 96% (13 Apr 2022 05:51) (95% - 98%)      PHYSICAL EXAM:    General: Well developed; well nourished; in no acute distress  HEENT: MMM, conjunctiva and sclera clear  Gastrointestinal: Soft, non-tender non-distended; Normal bowel sounds; No rebound or guarding  Extremities: Normal range of motion, No clubbing, cyanosis or edema  Neurological: Alert and oriented x3  Skin: Warm and dry. No obvious rash    LABS:                        7.1    9.45  )-----------( 269      ( 13 Apr 2022 07:11 )             22.8     04-13    142  |  108  |  29<H>  ----------------------------<  103<H>  4.4   |  28  |  1.51<H>    Ca    8.8      13 Apr 2022 07:11  Phos  3.3     04-13  Mg     1.7     04-13    TPro  5.9<L>  /  Alb  3.7  /  TBili  0.3  /  DBili  x   /  AST  21  /  ALT  21  /  AlkPhos  82  04-13        RADIOLOGY & ADDITIONAL STUDIES:    HPI:  76F PMH CVA (no residual deficits hypothyroidism, HTN, HLD, Anxiety/Depression and breast CA (s/p lumpectomy 15 years ago, not on rx)  presents from outpt [facility] service ([facility]) for evaluation of anemia, ALYSHA in the setting of possible GI bleed. Pt reports fatigue, weakness, n/v/d X 2 days. Had 2-3 episodes of NBNB diarrhea and NBNB emesis X 2 days that have self resolved now, last episode of diarrhea/emesis was on 4/11 am. Does not recall eating anything unusual, sick contacts and recent travel hx. Pt went to [facility] [place] today for evaluation of weakness/ fatigue w/ labs reveling anemia, +FOBT and ? CT A/P (pt not sure of findings)  and was sent to ED for further eval. Denies hx of GIB requiring transfusions.  Pt currently on ASA/plavix, denies melena, hematemesis, hematochezia, BRBPR, sob, chest pain, f/c, abd pain and dysuria. Last colonoscopy 5 years ago normal, never had endoscopy. Denies current NSIAD, EtOH and tobacco use. Used to be a heavy cigarette / EtoH user in the past, reports abstinence for 15+ years now. Reports last screenings were done 3 years ago and were normal at that time. Denies any recent weight lose, f/c, changes in bowel/ bladder habits and night sweats.     ED Course:   VS: 98.3, 109, 153/70, 16 98 RA  Labs: WBC 15, Hgb 8, MCV 93, BUN 38, Cr 1.37, COVID -  Imaging: CXR: No acute pathology; EKG: Sinus Tach, QTc 452  Rx: Protonix IV 80,  (12 Apr 2022 20:39)    GI consulted for anemia in the setting of abnormal outpatient CT imaging revealing antral wall thickening and for consideration of endoscopic evaluation.     Allergies    No Known Allergies    Intolerances      MEDICATIONS:  MEDICATIONS  (STANDING):  atorvastatin 80 milliGRAM(s) Oral at bedtime  buPROPion XL (24-Hour) . 300 milliGRAM(s) Oral daily  donepezil 5 milliGRAM(s) Oral at bedtime  fludroCORTISONE 0.1 milliGRAM(s) Oral daily  levothyroxine 100 MICROGram(s) Oral daily  magnesium sulfate  IVPB 2 Gram(s) IV Intermittent once  mirtazapine 15 milliGRAM(s) Oral daily  pantoprazole Infusion 8 mG/Hr (10 mL/Hr) IV Continuous <Continuous>  sodium chloride 0.9%. 1000 milliLiter(s) (100 mL/Hr) IV Continuous <Continuous>    MEDICATIONS  (PRN):  acetaminophen     Tablet .. 650 milliGRAM(s) Oral every 6 hours PRN Temp greater or equal to 38C (100.4F), Mild Pain (1 - 3)    PAST MEDICAL & SURGICAL HISTORY:  Hypothyroidism  Hypothyroidism    Depressive disorder  Depression    Cerebrovascular accident (CVA)    HLD (hyperlipidemia)    Breast CA      FAMILY HISTORY:    SOCIAL HISTORY:  Tobacoo: [ ] Current, [ ] Former, [ ] Never; Pack Years:  Alcohol:  Illicit Drugs:    REVIEW OF SYSTEMS:  Constitutional: No fever, chills, weight loss, or fatigue  ENMT:  No visual changes; No difficulty hearing, tinnitus, vertigo; No sinus or throat pain  Neck: No pain or stiffness  Respiratory: No cough, wheezing, or hemoptysis; No shortness of breath  Cardiovascular: No chest pain, palpitations, dizziness, orthopnea, PND, or leg swelling  Gastrointestinal: No abdominal or epigastric pain. No  nausea, vomiting, or hematemesis. No diarrhea, constipation, or steatorrhea. No melena or hematochezia  Genitourinary: No dysuria, urinary frequency/hesitancy, or hematuria  Skin: No itching, burning, rashes or lesions   Musculoskeletal: No joint pain or swelling; No muscle, back or extremity pain    Vital Signs Last 24 Hrs  T(C): 36.9 (13 Apr 2022 05:51), Max: 37.8 (13 Apr 2022 01:34)  T(F): 98.4 (13 Apr 2022 05:51), Max: 100.1 (13 Apr 2022 01:34)  HR: 99 (13 Apr 2022 05:51) (90 - 108)  BP: 107/62 (13 Apr 2022 05:51) (100/55 - 153/70)  BP(mean): --  RR: 17 (13 Apr 2022 05:51) (16 - 18)  SpO2: 96% (13 Apr 2022 05:51) (95% - 98%)      PHYSICAL EXAM:    General:  female; in no acute distress  HEENT: MMM, conjunctiva and sclera clear  Gastrointestinal: Soft, non-tender non-distended; Normal bowel sounds; No rebound or guarding  Extremities: Normal range of motion, No clubbing, cyanosis or edema  Neurological: Alert and oriented x3  Skin: Warm and dry. No obvious rash    LABS:                        7.1    9.45  )-----------( 269      ( 13 Apr 2022 07:11 )             22.8     04-13    142  |  108  |  29<H>  ----------------------------<  103<H>  4.4   |  28  |  1.51<H>    Ca    8.8      13 Apr 2022 07:11  Phos  3.3     04-13  Mg     1.7     04-13    TPro  5.9<L>  /  Alb  3.7  /  TBili  0.3  /  DBili  x   /  AST  21  /  ALT  21  /  AlkPhos  82  04-13        RADIOLOGY & ADDITIONAL STUDIES:    HPI:  76F PMH CVA (no residual deficits hypothyroidism, HTN, HLD, Anxiety/Depression and breast CA (s/p lumpectomy 15 years ago, not on rx)  presents from outpt [facility] service ([facility]) for evaluation of anemia, ALYSHA in the setting of possible GI bleed. Pt reports fatigue, weakness, n/v/d X 2 days. Had 2-3 episodes of NBNB diarrhea and NBNB emesis X 2 days that have self resolved now, last episode of diarrhea/emesis was on 4/11 am. Does not recall eating anything unusual, sick contacts and recent travel hx. Pt went to [facility] [place] today for evaluation of weakness/ fatigue w/ labs reveling anemia, +FOBT and ? CT A/P (pt not sure of findings)  and was sent to ED for further eval. Denies hx of GIB requiring transfusions.  Pt currently on ASA/plavix, denies melena, hematemesis, hematochezia, BRBPR, sob, chest pain, f/c, abd pain and dysuria. Last colonoscopy 5 years ago normal, never had endoscopy. Denies current NSIAD, EtOH and tobacco use. Used to be a heavy cigarette / EtoH user in the past, reports abstinence for 15+ years now. Reports last screenings were done 3 years ago and were normal at that time. Denies any recent weight lose, f/c, changes in bowel/ bladder habits and night sweats.     ED Course:   VS: 98.3, 109, 153/70, 16 98 RA  Labs: WBC 15, Hgb 8, MCV 93, BUN 38, Cr 1.37, COVID -  Imaging: CXR: No acute pathology; EKG: Sinus Tach, QTc 452  Rx: Protonix IV 80,  (12 Apr 2022 20:39)    GI consulted for anemia in the setting of abnormal outpatient CT imaging revealing antral wall thickening and for consideration of endoscopic evaluation.     Allergies    No Known Allergies    Intolerances      MEDICATIONS:  MEDICATIONS  (STANDING):  atorvastatin 80 milliGRAM(s) Oral at bedtime  buPROPion XL (24-Hour) . 300 milliGRAM(s) Oral daily  donepezil 5 milliGRAM(s) Oral at bedtime  fludroCORTISONE 0.1 milliGRAM(s) Oral daily  levothyroxine 100 MICROGram(s) Oral daily  magnesium sulfate  IVPB 2 Gram(s) IV Intermittent once  mirtazapine 15 milliGRAM(s) Oral daily  pantoprazole Infusion 8 mG/Hr (10 mL/Hr) IV Continuous <Continuous>  sodium chloride 0.9%. 1000 milliLiter(s) (100 mL/Hr) IV Continuous <Continuous>    MEDICATIONS  (PRN):  acetaminophen     Tablet .. 650 milliGRAM(s) Oral every 6 hours PRN Temp greater or equal to 38C (100.4F), Mild Pain (1 - 3)    PAST MEDICAL & SURGICAL HISTORY:  Hypothyroidism  Hypothyroidism    Depressive disorder  Depression    Cerebrovascular accident (CVA)    HLD (hyperlipidemia)    Breast CA      FAMILY HISTORY:    SOCIAL HISTORY:  Tobacco: none  Alcohol: none  Illicit Drugs:    REVIEW OF SYSTEMS:  Constitutional: No fever, chills, weight loss, or fatigue  ENMT:  No visual changes; No difficulty hearing, tinnitus, vertigo; No sinus or throat pain  Neck: No pain or stiffness  Respiratory: No cough, wheezing, or hemoptysis; No shortness of breath  Cardiovascular: No chest pain, palpitations, dizziness, orthopnea, PND, or leg swelling  Gastrointestinal: No abdominal or epigastric pain. No  nausea, vomiting, or hematemesis. No diarrhea, constipation, or steatorrhea. No melena or hematochezia  Genitourinary: No dysuria, urinary frequency/hesitancy, or hematuria  Skin: No itching, burning, rashes or lesions   Musculoskeletal: No joint pain or swelling; No muscle, back or extremity pain    Vital Signs Last 24 Hrs  T(C): 36.9 (13 Apr 2022 05:51), Max: 37.8 (13 Apr 2022 01:34)  T(F): 98.4 (13 Apr 2022 05:51), Max: 100.1 (13 Apr 2022 01:34)  HR: 99 (13 Apr 2022 05:51) (90 - 108)  BP: 107/62 (13 Apr 2022 05:51) (100/55 - 153/70)  BP(mean): --  RR: 17 (13 Apr 2022 05:51) (16 - 18)  SpO2: 96% (13 Apr 2022 05:51) (95% - 98%)      PHYSICAL EXAM:    General:  female; in no acute distress  HEENT: MMM, conjunctiva and sclera clear  Gastrointestinal: Soft, non-tender non-distended; Normal bowel sounds; No rebound or guarding  Extremities: Normal range of motion, No clubbing, cyanosis or edema  Neurological: Alert and oriented x3  Skin: Warm and dry. No obvious rash    LABS:                        7.1    9.45  )-----------( 269      ( 13 Apr 2022 07:11 )             22.8     04-13    142  |  108  |  29<H>  ----------------------------<  103<H>  4.4   |  28  |  1.51<H>    Ca    8.8      13 Apr 2022 07:11  Phos  3.3     04-13  Mg     1.7     04-13    TPro  5.9<L>  /  Alb  3.7  /  TBili  0.3  /  DBili  x   /  AST  21  /  ALT  21  /  AlkPhos  82  04-13        RADIOLOGY & ADDITIONAL STUDIES:

## 2022-04-13 NOTE — CHART NOTE - NSCHARTNOTEFT_GEN_A_CORE
EGD performed in endoscopy suite with Dr Talavera and myself, findings as noted below:    Impression:  -Normal duodenum.    -Hiatal Hernia in the lower third of the esophagus.    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    -Ulcer in the antrum. (Biopsy).       Plan:   -Resume Previous Diet  -Await pathology results.  -Protonix 40 mg po bid x 2 weeks followed by daily  -Repeat CT A/P w/ IV & PO contrast  -Repeat EGD in 2 months? ?  -Monitor H/H

## 2022-04-13 NOTE — PROGRESS NOTE ADULT - PROBLEM SELECTOR PLAN 3
WBC 15. CXR-, UA -. Most likely refractive in the setting of possible slow GIB and/or recent viral gastroenteritis (pt w/ NBNB diarrhea/emesis X 2 days, that self resolved, last episodes 4/11 am)    -c/t trend   -no abx at this time Cr 1.37. Baseline Cr unknown. Most likely prerenal in the setting of slow GI bleed and recent viral gastroenteritis (pt w/ NBNB diarrhea/emesis X 2 days, that self resolved, last episodes 4/11 am). now w worsening Cr likely as she was NPO overnight along w GI bleed. FeNa 0.8% consistent w pre renal  -Started maintenance fluids while NPO, NS 100cc/hr  -avoid nephrotoxins, renally dose meds  -will reach out to PCP for collateral

## 2022-04-13 NOTE — PATIENT PROFILE ADULT - FALL HARM RISK - RISK INTERVENTIONS

## 2022-04-13 NOTE — CONSULT NOTE ADULT - ASSESSMENT
76F PMH CVA (no residual deficits hypothyroidism, CKD, HTN, HLD, Anxiety/Depression and breast CA (s/p lumpectomy 15 years ago, not on rx)  presents from outpt  service ([facility]) for evaluation of anemia after outpatient CT imaging revealing antral wall thickening. GI consulted for anemia and for consideration of endoscopic evaluation.     #Anemia  #Abnormal CT scan  -Fe studies with low iron saturation, low Fe, c/w KELSEY   -Hemolysis labs negative  -Closely monitor H/H  -Maintain Active T&S  -Transfusion goal as per primary team    #Nausea/Vomiting   #Diarrhea  Presenting with 4 day history of nausea/vomiting, and 1 day of diarrhea (since resolved). Nausea/vomiting improving, no further episodes of vomiting while inpatient   - Supportive management as per primary team    76F PMH CVA (no residual deficits hypothyroidism, CKD, HTN, HLD, Anxiety/Depression and breast CA (s/p lumpectomy 15 years ago, not on rx)  presents from out  service ([facility]) for evaluation of anemia after outpatient CT imaging revealing antral wall thickening. GI consulted for anemia and for consideration of endoscopic evaluation.     #Symptomatic Anemia  #Abnormal CT scan  Patient presenting with 4 day history of nausea/vomiting and 1 day of diarrhea, evaluated at Urgent Care center  on 4/12/22, found to have a Hgb of 8.6 (2020, Hgb 12.2). CT A/P (outpateint) revealed thickening of gastric antrum c/f ulceration. Patient advised to proceed to ED based on findings. On admission, with no complaints of overt bleeding including hematemesis, coffee ground emesis, hematochezia, or melena however reporting lightheadedness, fatigue at urgent care center. Hgb 8.0 on admission, FOBT + (also on ASA 81 mg daily, which can show + result). Fe studies c/w KELSEY. ddx wide however includes PUD vs malignancy. AVMs also on ddx given underlying CKD however lower suspicion given no e/o overt bleeding.   -c/w PPI gtt  -Fe studies with low iron saturation, low Fe, c/w KELSEY   -Hemolysis labs negative  -Closely monitor H/H  -Maintain Active T&S  -Transfusion goal as per primary team  -Keep NPO in preparation for EGD today    #Nausea/Vomiting   #Diarrhea  Presenting with 4 day history of nausea/vomiting, and 1 day of diarrhea (since resolved). Nausea/vomiting improving, no further episodes of vomiting while inpatient, ddx includes gastroenteritis   - Supportive management as per primary team    Case discussed with c attending and primary team.     Aisha Dueñas DO  Gastroenterology Fellow  Pager: [phone]

## 2022-04-13 NOTE — PROGRESS NOTE ADULT - PROBLEM SELECTOR PLAN 6
No residual deficits. Home med: ASA, Atorvastatin 80, Plavix 75  -hold ASA, Plavix in the setting of GIB and possible scoping in am   -c/w Statin No residual deficits. Home med: ASA, Atorvastatin 80, Plavix 75  -hold ASA, Plavix in the setting of GIB and possible scoping in am   -c/w Statin    #Fludricortisone   Pt takes fludricortisone at home. Reason unclear as pt also on olmesartan. Pt and pt's  don't know why.   - c/w fludricortisone  - collateral from PCP

## 2022-04-14 ENCOUNTER — TRANSCRIPTION ENCOUNTER (OUTPATIENT)
Age: 77
End: 2022-04-14

## 2022-04-14 VITALS
DIASTOLIC BLOOD PRESSURE: 52 MMHG | SYSTOLIC BLOOD PRESSURE: 94 MMHG | RESPIRATION RATE: 18 BRPM | TEMPERATURE: 99 F | HEART RATE: 81 BPM | OXYGEN SATURATION: 96 %

## 2022-04-14 DIAGNOSIS — K27.9 PEPTIC ULCER, SITE UNSPECIFIED, UNSPECIFIED AS ACUTE OR CHRONIC, WITHOUT HEMORRHAGE OR PERFORATION: ICD-10-CM

## 2022-04-14 LAB
ANION GAP SERPL CALC-SCNC: 9 MMOL/L — SIGNIFICANT CHANGE UP (ref 5–17)
BUN SERPL-MCNC: 21 MG/DL — SIGNIFICANT CHANGE UP (ref 7–23)
CALCIUM SERPL-MCNC: 8.4 MG/DL — SIGNIFICANT CHANGE UP (ref 8.4–10.5)
CHLORIDE SERPL-SCNC: 108 MMOL/L — SIGNIFICANT CHANGE UP (ref 96–108)
CO2 SERPL-SCNC: 25 MMOL/L — SIGNIFICANT CHANGE UP (ref 22–31)
CREAT SERPL-MCNC: 1.47 MG/DL — HIGH (ref 0.5–1.3)
EGFR: 37 ML/MIN/1.73M2 — LOW
FOLATE SERPL-MCNC: 6.5 NG/ML — SIGNIFICANT CHANGE UP
GLUCOSE SERPL-MCNC: 99 MG/DL — SIGNIFICANT CHANGE UP (ref 70–99)
HCT VFR BLD CALC: 24.2 % — LOW (ref 34.5–45)
HGB BLD-MCNC: 7.7 G/DL — LOW (ref 11.5–15.5)
MAGNESIUM SERPL-MCNC: 2 MG/DL — SIGNIFICANT CHANGE UP (ref 1.6–2.6)
MCHC RBC-ENTMCNC: 28.1 PG — SIGNIFICANT CHANGE UP (ref 27–34)
MCHC RBC-ENTMCNC: 31.8 GM/DL — LOW (ref 32–36)
MCV RBC AUTO: 88.3 FL — SIGNIFICANT CHANGE UP (ref 80–100)
NRBC # BLD: 0 /100 WBCS — SIGNIFICANT CHANGE UP (ref 0–0)
PHOSPHATE SERPL-MCNC: 4.2 MG/DL — SIGNIFICANT CHANGE UP (ref 2.5–4.5)
PLATELET # BLD AUTO: 246 K/UL — SIGNIFICANT CHANGE UP (ref 150–400)
POTASSIUM SERPL-MCNC: 4.1 MMOL/L — SIGNIFICANT CHANGE UP (ref 3.5–5.3)
POTASSIUM SERPL-SCNC: 4.1 MMOL/L — SIGNIFICANT CHANGE UP (ref 3.5–5.3)
RBC # BLD: 2.74 M/UL — LOW (ref 3.8–5.2)
RBC # FLD: 15.8 % — HIGH (ref 10.3–14.5)
SODIUM SERPL-SCNC: 142 MMOL/L — SIGNIFICANT CHANGE UP (ref 135–145)
SURGICAL PATHOLOGY STUDY: SIGNIFICANT CHANGE UP
VIT B12 SERPL-MCNC: 868 PG/ML — SIGNIFICANT CHANGE UP (ref 232–1245)
WBC # BLD: 7.28 K/UL — SIGNIFICANT CHANGE UP (ref 3.8–10.5)
WBC # FLD AUTO: 7.28 K/UL — SIGNIFICANT CHANGE UP (ref 3.8–10.5)

## 2022-04-14 PROCEDURE — 99232 SBSQ HOSP IP/OBS MODERATE 35: CPT

## 2022-04-14 RX ORDER — PANTOPRAZOLE SODIUM 20 MG/1
1 TABLET, DELAYED RELEASE ORAL
Qty: 28 | Refills: 0
Start: 2022-04-14 | End: 2022-04-27

## 2022-04-14 RX ORDER — CLOPIDOGREL BISULFATE 75 MG/1
1 TABLET, FILM COATED ORAL
Qty: 0 | Refills: 0 | DISCHARGE

## 2022-04-14 RX ORDER — LANOLIN ALCOHOL/MO/W.PET/CERES
3 CREAM (GRAM) TOPICAL ONCE
Refills: 0 | Status: COMPLETED | OUTPATIENT
Start: 2022-04-14 | End: 2022-04-14

## 2022-04-14 RX ORDER — FERROUS SULFATE 325(65) MG
1 TABLET ORAL
Qty: 15 | Refills: 1
Start: 2022-04-14 | End: 2022-06-12

## 2022-04-14 RX ORDER — OLMESARTAN MEDOXOMIL 5 MG/1
1 TABLET, FILM COATED ORAL
Qty: 0 | Refills: 0 | DISCHARGE

## 2022-04-14 RX ORDER — ASPIRIN/CALCIUM CARB/MAGNESIUM 324 MG
1 TABLET ORAL
Qty: 0 | Refills: 0 | DISCHARGE

## 2022-04-14 RX ADMIN — BUPROPION HYDROCHLORIDE 300 MILLIGRAM(S): 150 TABLET, EXTENDED RELEASE ORAL at 13:09

## 2022-04-14 RX ADMIN — ATORVASTATIN CALCIUM 80 MILLIGRAM(S): 80 TABLET, FILM COATED ORAL at 00:28

## 2022-04-14 RX ADMIN — DESVENLAFAXINE 50 MILLIGRAM(S): 50 TABLET, EXTENDED RELEASE ORAL at 13:07

## 2022-04-14 RX ADMIN — Medication 3 MILLIGRAM(S): at 00:28

## 2022-04-14 RX ADMIN — Medication 1 MILLIGRAM(S): at 13:08

## 2022-04-14 RX ADMIN — Medication 100 MICROGRAM(S): at 07:08

## 2022-04-14 RX ADMIN — FLUDROCORTISONE ACETATE 0.1 MILLIGRAM(S): 0.1 TABLET ORAL at 13:07

## 2022-04-14 RX ADMIN — MIRTAZAPINE 15 MILLIGRAM(S): 45 TABLET, ORALLY DISINTEGRATING ORAL at 13:07

## 2022-04-14 NOTE — DISCHARGE NOTE PROVIDER - NSDCFUADDAPPT_GEN_ALL_CORE_FT
Please follow up with your primary care provider Dr. Prado within 1 week to check your hemoglobin level for anemia and creatinine to check your kidney function. Please follow up with your primary care provider Dr. Prado within 1 week to check your hemoglobin level for anemia, and creatinine to check your kidney function. Discuss restarting your blood pressure medication, olmesartan based on your kidney function.    Please follow up with Dr. Talavera regarding your stomach biopsy results within 1-2 weeks. His office will call to schedule an appointment with you.

## 2022-04-14 NOTE — DISCHARGE NOTE PROVIDER - NSDCMRMEDTOKEN_GEN_ALL_CORE_FT
Aspir 81 oral delayed release tablet: 1 tab(s) orally once a day  atorvastatin 80 mg oral tablet: 1 tab(s) orally once a day  clopidogrel 75 mg oral tablet: 1 tab(s) orally once a day  donepezil 5 mg oral tablet: 1 tab(s) orally once a day (at bedtime)  fludrocortisone 0.1 mg oral tablet: 1 tab(s) orally once a day  mirtazapine 15 mg oral tablet: 1 tab(s) orally once a day (at bedtime)  olmesartan 20 mg oral tablet: 1 tab(s) orally once a day  Synthroid 100 mcg (0.1 mg) oral tablet: 1 tab(s) orally once a day  Wellbutrin  mg/24 hours oral tablet, extended release: 1 tab(s) orally every 24 hours   atorvastatin 80 mg oral tablet: 1 tab(s) orally once a day  donepezil 5 mg oral tablet: 1 tab(s) orally once a day (at bedtime)  ferrous sulfate 325 mg (65 mg elemental iron) oral delayed release tablet: 1 tab(s) orally every other day   fludrocortisone 0.1 mg oral tablet: 1 tab(s) orally once a day  mirtazapine 15 mg oral tablet: 1 tab(s) orally once a day (at bedtime)  pantoprazole 40 mg oral delayed release tablet: 1 tab(s) orally 2 times a day   pantoprazole 40 mg oral delayed release tablet: 1 tab(s) orally once a day   Synthroid 100 mcg (0.1 mg) oral tablet: 1 tab(s) orally once a day  Wellbutrin  mg/24 hours oral tablet, extended release: 1 tab(s) orally every 24 hours

## 2022-04-14 NOTE — DISCHARGE NOTE NURSING/CASE MANAGEMENT/SOCIAL WORK - PATIENT PORTAL LINK FT
You can access the FollowMyHealth Patient Portal offered by [facility] by registering at the following website: [email or web address]. By joining [email or web address]’s FollowMyHealth portal, you will also be able to view your health information using other applications (apps) compatible with our system.

## 2022-04-14 NOTE — PROGRESS NOTE ADULT - ATTENDING COMMENTS
Seen/discussed at bedside with fellow  Agree with above
Date of service 4/13/22  Pt reports energy level not changed. No nausea, abd pain. Denies wt loss, fever, chills, sweats, changes in wt recently. No NSAID use or EtOH use.     #KELSEY - c/f UGIB - slow loss d/t DAPT vs ulcer vs gastritis/duodenitis vs malignancy  #ALYSHA  #Depression/anxiety  #Hypothyroidism - TSH and FT4    Plan  EGD/[facility] - revealed ulcer s/p Bx. f/u path  c/w PPI  f/u GI Recs  Avoid NSAIDs, encourage PO/Fluid intake  Obtain collateral on baseline Cr, Hgb, reason for fludrocortisone    DISPO: Home pending tolerating diet, GI final recs, stable hgb. Anticipate DC 24-48h

## 2022-04-14 NOTE — DISCHARGE NOTE NURSING/CASE MANAGEMENT/SOCIAL WORK - NSDCPEFALRISK_GEN_ALL_CORE
For information on Fall & Injury Prevention, visit: [email or web address] OR  [email or web address] OR  https://www.cdc.gov/steadi/patient.html

## 2022-04-14 NOTE — DISCHARGE NOTE PROVIDER - HOSPITAL COURSE
#Discharge: do not delete    76F PMH CVA (no residual deficits hypothyroidism, HTN, HLD, Anxiety/Depression and breast CA (s/p lumpectomy 15 years ago, not on rx)  presents from outpt  service ([facility]) for evaluation of anemia, ALYSHA in the setting of possible GI bleed.        #GI bleed.   Presents from outpatient provider w fatigue after 2 days of N/V diarrhea, found to have hgb 8, did CT showing mucosal inflammation of gastric antrum and suspicious for ulcer. Diverticulosis but no diverticulitis. Denies hx of GIB requiring transfusions. Pt currently on ASA/plavix, denies melena, hematemesis, hematochezia, BRBPR. Last colonoscopy 5 years ago normal, never had endoscopy. Denies current NSAID, EtOH and tobacco use. Not UTD on screenings (pt w/ hx of Breast CA, s/p Lumpectomy 15 years ago). Denies B symptoms. Pt tachy but HD stable. Hgb 8-->7.6-->7.1-->6.6 transfused 1U pRBC and went up to 7.7. FOBT+. GI consulted. Elevated BUN. Iron panel showing low iron saturation. Retic index 0.49, low. IV PPI. GI did EGD showing suspicious ulcer, biopsied and will f/u outpatient. Did CT w IV and oral contrast showing thickening of gastric antrum, ulcer worrisome for malignancy. Non enlarged LN inferior to gastroduodenal junction which could be reactive or malignant. Pt stable w appropriate response to transfusion. Will put on ferrous sulfate 350 mg PO every other day and f/u w GI about biopsy results and get CBC.    #Acute Anemia 2/2 GI bleed   Normocytic anemia. Hgb 8, MVC 90 on admission. Baseline Hgb 11.9. Acute drop. See above.    #ALYSHA (acute kidney injury).   Cr 1.37. Baseline Cr 1.08. Most likely prerenal in the setting of slow GI bleed and recent viral gastroenteritis (pt w/ NBNB diarrhea/emesis X 2 days, that self resolved, last episodes 4/11 am). Improved w fluids then worsened again likely 2/2 contrast for CT scan. Will need f/u BMP on discharge.    #Leukocytosis.   WBC 15. CXR-, UA -. Most likely refractive in the setting of possible slow GIB and/or recent viral gastroenteritis (pt w/ NBNB diarrhea/emesis X 2 days, that self resolved, last episodes 4/11 am). Resolved w fluid resuscitation.    #Hypothyroidism.   Continued w synthroid.     #Cerebrovascular accident (CVA).   No residual deficits. Home med: ASA, Atorvastatin 80, Plavix 75. Per PCP there was not major event and imaging showed chronic microvascular ischemic finding. Will d/c anti platelets on d/c.    #Fludricortisone   Pt takes fludricortisone at home for orthostatic hypotension. Continued.    #HTN (hypertension).   Olmesartan 20mg daily at home. Held ARB in the setting of ALYSHA, GIB.      Inpatient treatment course: Received 1U pRBC w appropriate response, EGD and CT showed ulcer, thickening of gastric antrum s/p ulcer biopsy  New medications: Ferrous sulfate 325mg PO every other day, stopped aspirin and plavix  Labs to be followed outpatient: BMP to check renal function, CBC to check anemia  Exam to be followed outpatient: F/u ulcer biopsy results   #Discharge: do not delete    76F PMH CVA (no residual deficits hypothyroidism, HTN, HLD, Anxiety/Depression and breast CA (s/p lumpectomy 15 years ago, not on rx)  presents from outpt  service ([facility]) for evaluation of anemia, ALYSHA in the setting of possible GI bleed.        #GI bleed.   Presents from outpatient provider w fatigue after 2 days of N/V diarrhea, found to have hgb 8, did CT showing mucosal inflammation of gastric antrum and suspicious for ulcer. Diverticulosis but no diverticulitis. Denies hx of GIB requiring transfusions. Pt currently on ASA/plavix, denies melena, hematemesis, hematochezia, BRBPR. Last colonoscopy 5 years ago normal, never had endoscopy. Denies current NSAID, EtOH and tobacco use. Not UTD on screenings (pt w/ hx of Breast CA, s/p Lumpectomy 15 years ago). Denies B symptoms. Pt tachy but HD stable. Hgb 8-->7.6-->7.1-->6.6 transfused 1U pRBC and went up to 7.7. FOBT+. GI consulted. Elevated BUN. Iron panel showing low iron saturation. Retic index 0.49, low. IV PPI. GI did EGD showing suspicious ulcer, biopsied and will f/u outpatient. Did CT w IV and oral contrast showing thickening of gastric antrum, ulcer worrisome for malignancy. Non enlarged LN inferior to gastroduodenal junction which could be reactive or malignant. Pt stable w appropriate response to transfusion. Will put on ferrous sulfate 350 mg PO every other day and f/u w GI about biopsy results and get CBC.    #Acute Anemia 2/2 GI bleed   Normocytic anemia. Hgb 8, MVC 90 on admission. Baseline Hgb 11.9. Acute drop. See above.    #ALYSHA (acute kidney injury).   Cr 1.37. Baseline Cr 1.08. Most likely prerenal in the setting of slow GI bleed and recent viral gastroenteritis (pt w/ NBNB diarrhea/emesis X 2 days, that self resolved, last episodes 4/11 am). Improved w fluids then worsened again likely 2/2 contrast for CT scan. Will need f/u BMP on discharge.    #Leukocytosis.   WBC 15. CXR-, UA -. Most likely refractive in the setting of possible slow GIB and/or recent viral gastroenteritis (pt w/ NBNB diarrhea/emesis X 2 days, that self resolved, last episodes 4/11 am). Resolved w fluid resuscitation.    #Hypothyroidism.   Continued w synthroid.     #Cerebrovascular accident (CVA).   No residual deficits. Home med: ASA, Atorvastatin 80, Plavix 75. Per PCP there was not major event and imaging showed chronic microvascular ischemic finding. Will d/c anti platelets on d/c.    #Fludricortisone   Pt takes fludricortisone at home for orthostatic hypotension. Continued.    #HTN (hypertension).   Olmesartan 20mg daily at home. Held ARB in the setting of ALYSHA, GIB. Restart as outpatient.      Inpatient treatment course: Received 1U pRBC w appropriate response, EGD and CT showed ulcer, thickening of gastric antrum s/p ulcer biopsy  New medications: Ferrous sulfate 325mg PO every other day, stopped aspirin and plavix, Stopped olmesartan due to ALYSHA, discuss restarting on D/c  Labs to be followed outpatient: BMP to check renal function, CBC to check anemia  Exam to be followed outpatient: F/u ulcer biopsy results

## 2022-04-14 NOTE — PROGRESS NOTE ADULT - ASSESSMENT
76F PMH CVA (no residual deficits hypothyroidism, CKD, HTN, HLD, Anxiety/Depression and breast CA (s/p lumpectomy 15 years ago, not on rx)  [facility] evaluation of anemia after outpatient CT imaging revealing antral wall thickening. GI consulted for anemia and for consideration of endoscopic evaluation.     #Symptomatic Anemia  #Abnormal CT scan  Patient presenting with 4 day history of nausea/vomiting and 1 day of diarrhea, evaluated at Urgent Care center  on 4/12/22, found to have a Hgb of 8.6 (2020, Hgb 12.2). [facility] revealed thickening of gastric antrum c/f ulceration. Patient advised to proceed to ED based on findings. On admission, with no complaints of overt bleeding including hematemesis, coffee ground emesis, hematochezia, or melena however reporting lightheadedness, fatigue at urgent care center. Hgb 8.0 on admission, FOBT + (also on ASA 81 mg daily, which can show + result). Fe studies c/w KELSEY. ddx wide however includes PUD vs malignancy. AVMs also on ddx given underlying CKD however lower suspicion given no e/o overt bleeding.   -Fe studies with low iron saturation, low Fe, c/w KELSEY   -Hemolysis labs negative  -Closely monitor H/H  -Maintain Active T&S  -Transfusion goal as per primary team  -EGD (4/12) Normal duodenum. Hiatal Hernia in the lower third of the esophagus.  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 Ulcer in the antrum. (Biopsy).    -Will follow up pathology   -PPI BID x 2 weeks, followed by daily  -CT A/P (4/13) - Small hiatal hernia. Asymmetric wall thickening of the gastric antrum with narrowing of the pylorus. No visualized gastric outlet obstruction. Recommend endoscopy and biopsy. Differential diagnosis would include gastric adenocarcinoma, lymphoma. At least two flor gastric nodes noted up to 0.6 in diameter. No focal liver lesion. Incidental 1.1 x 1.1 cm left adrenal nodule. Duplex collecting system left kidney. No ascites. Normal appendix. Unremarkable uterus and ovaries. Incidental fat-containing left posterior diaphragmatic hernia.  -Will need repeat EGD in 2 months however in the interim, will discuss based on above CT findings warrant an EUS    #Nausea/Vomiting   #Diarrhea  Presenting with 4 day history of nausea/vomiting, and 1 day of diarrhea (since resolved). Nausea/vomiting improving, no further episodes of vomiting while inpatient, ddx includes gastroenteritis   - Supportive management as per primary team    INCOMPLETE NOTE    Aisha Dueñas DO  Gastroenterology Fellow  Pager: [phone] 76F PMH CVA (no residual deficits hypothyroidism, CKD, HTN, HLD, Anxiety/Depression and breast CA (s/p lumpectomy 15 years ago, not on rx)  [facility] of [facility] after outpatient CT imaging revealing antral wall thickening. GI consulted for anemia and for consideration of endoscopic evaluation.     #Symptomatic Anemia  #Abnormal CT scan  Patient presenting with 4 day history of nausea/vomiting and 1 day of diarrhea, evaluated at Urgent Care center  on 4/12/22, found to have a Hgb of 8.6 (2020, Hgb 12.2). [facility] revealed thickening of gastric antrum c/f ulceration. Patient advised to proceed to ED based on findings. On admission, with no complaints of overt bleeding including hematemesis, coffee ground emesis, hematochezia, or melena however reporting lightheadedness, fatigue at urgent care center. Hgb 8.0 on admission, FOBT + (also on ASA 81 mg daily, which can show + result). Fe studies c/w KELSEY. ddx wide however includes PUD vs malignancy. AVMs also on ddx given underlying CKD however lower suspicion given no e/o overt bleeding.   -Fe studies with low iron saturation, low Fe, c/w KELSEY   -Hemolysis labs negative  -Closely monitor H/H  -Maintain Active T&S  -Transfusion goal as per primary team  -EGD (4/12) Normal duodenum. Hiatal Hernia in the lower third of the esophagus.  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 Ulcer in the antrum. (Biopsy).         *Path: Necrotic tissue and acute inflammatory exudate consistent with ulcer; Small fragments of gastric mucosa without significant pathologic features  -PPI BID x 2 weeks, followed by daily  -CT A/P (4/13) - Small hiatal hernia. Asymmetric wall thickening of the gastric antrum with narrowing of the pylorus. No visualized gastric outlet obstruction. Recommend endoscopy and biopsy. Differential diagnosis would include gastric adenocarcinoma, lymphoma. At least two flor gastric nodes noted up to 0.6 in diameter. No focal liver lesion. Incidental 1.1 x 1.1 cm left adrenal nodule. Duplex collecting system left kidney. No ascites. Normal appendix. Unremarkable uterus and ovaries. Incidental fat-containing left posterior diaphragmatic hernia.  -Will need repeat EGD/EUS in 2-3 weeks as an outpatient with Dr Talavera    #Nausea/Vomiting   #Diarrhea  Presenting with 4 day history of nausea/vomiting, and 1 day of diarrhea (since resolved). Nausea/vomiting improving, no further episodes of vomiting while inpatient, ddx includes gastroenteritis   - Supportive management as per primary team    Case discussed with [facility] attending and primary team.     Aisha Dueñas DO  Gastroenterology Fellow  Pager: [phone]

## 2022-04-14 NOTE — DISCHARGE NOTE NURSING/CASE MANAGEMENT/SOCIAL WORK - NSDCFUADDAPPT_GEN_ALL_CORE_FT
Please follow up with your primary care provider Dr. Prado within 1 week to check your hemoglobin level for anemia, and creatinine to check your kidney function. Discuss restarting your blood pressure medication, olmesartan based on your kidney function.    Please follow up with Dr. Talavera regarding your stomach biopsy results within 1-2 weeks. His office will call to schedule an appointment with you.

## 2022-04-14 NOTE — PROGRESS NOTE ADULT - SUBJECTIVE AND OBJECTIVE BOX
INTERVAL HPI/OVERNIGHT EVENTS:  Patient was seen and examined at bedside. Made NPO at MN for possible EGD. Started on maintenance fluids, NS 100cc/hr. Pt says fatigue is the same. Had no lightheadedness or dizziness when walking to the bathroom this morning. Denies melena or BRBPR. Diarrhea and N/V resolved.     VITAL SIGNS:  T(F): 98.4 (22 @ 05:51)  HR: 99 (22 @ 05:51)  BP: 107/62 (22 @ 05:51)  RR: 17 (22 @ 05:51)  SpO2: 96% (22 @ 05:51)  Wt(kg): --    PHYSICAL EXAM:    Constitutional: Elderly F lying flat, appears comfortable  HEENT: dry MM  Respiratory: CTA b/l, no increased work of breathing  Cardiovascular: tachycardic, regular rhythm, no murmur  Gastrointestinal: soft, NTND  Extremities: WWP, no MARY  Neurological: AAOx3, no focal deficits  Skin: Normal temperature, warm, dry    MEDICATIONS  (STANDING):  atorvastatin 80 milliGRAM(s) Oral at bedtime  buPROPion XL (24-Hour) . 300 milliGRAM(s) Oral daily  donepezil 5 milliGRAM(s) Oral at bedtime  fludroCORTISONE 0.1 milliGRAM(s) Oral daily  levothyroxine 100 MICROGram(s) Oral daily  magnesium sulfate  IVPB 2 Gram(s) IV Intermittent once  mirtazapine 15 milliGRAM(s) Oral daily  pantoprazole Infusion 8 mG/Hr (10 mL/Hr) IV Continuous <Continuous>  sodium chloride 0.9%. 1000 milliLiter(s) (100 mL/Hr) IV Continuous <Continuous>    MEDICATIONS  (PRN):  acetaminophen     Tablet .. 650 milliGRAM(s) Oral every 6 hours PRN Temp greater or equal to 38C (100.4F), Mild Pain (1 - 3)      Allergies    No Known Allergies    Intolerances        LABS:                        7.1    9.45  )-----------( 269      ( 2022 07:11 )             22.8         142  |  108  |  29<H>  ----------------------------<  103<H>  4.4   |  28  |  1.51<H>    Ca    8.8      2022 07:11  Phos  3.3     04-13  Mg     1.7     04-13    TPro  5.9<L>  /  Alb  3.7  /  TBili  0.3  /  DBili  x   /  AST  21  /  ALT  21  /  AlkPhos  82  04-13    PT/INR - ( 2022 18:30 )   PT: 11.5 sec;   INR: 0.97          PTT - ( 2022 18:30 )  PTT:23.6 sec  Urinalysis Basic - ( 2022 20:19 )    Color: Yellow / Appearance: Clear / S.010 / pH: x  Gluc: x / Ketone: NEGATIVE  / Bili: Negative / Urobili: 0.2 E.U./dL   Blood: x / Protein: NEGATIVE mg/dL / Nitrite: NEGATIVE   Leuk Esterase: Trace / RBC: < 5 /HPF / WBC 5-10 /HPF   Sq Epi: x / Non Sq Epi: 0-5 /HPF / Bacteria: Rare /HPF          RADIOLOGY & ADDITIONAL TESTS:  Reviewed
GASTROENTEROLOGY PROGRESS NOTE  Patient seen and examined at bedside. s/p EGD () Normal duodenum. Hiatal Hernia in the lower third of the esophagus.  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 Ulcer in the antrum. (Biopsy).    - s/p EGD, Hgb 6.4, uptrended to 7.7 post 1 u pRBC   -CT A/P () - Small hiatal hernia. Asymmetric wall thickening of the gastric antrum with narrowing of the pylorus. No visualized gastric outlet obstruction. Recommend endoscopy and biopsy. Differential diagnosis would include gastric adenocarcinoma, lymphoma. At least two flor gastric nodes noted up to 0.6 in diameter. No focal liver lesion. Incidental 1.1 x 1.1 cm left adrenal nodule. Duplex collecting system left kidney. No ascites. Normal appendix. Unremarkable uterus and ovaries. Incidental fat-containing left posterior diaphragmatic hernia.    ROS: patient with no complaints of abdominal pain, no nausea/vomiting.    PERTINENT REVIEW OF SYSTEMS:  CONSTITUTIONAL: No weakness, fevers or chills  HEENT: No visual changes; No vertigo or throat pain   GASTROINTESTINAL: As above.  NEUROLOGICAL: No numbness or weakness  SKIN: No itching, burning, rashes, or lesions     Allergies    No Known Allergies    Intolerances      MEDICATIONS:  MEDICATIONS  (STANDING):  atorvastatin 80 milliGRAM(s) Oral at bedtime  buPROPion XL (24-Hour) . 300 milliGRAM(s) Oral daily  desvenlafaxine ER 50 milliGRAM(s) Oral every 24 hours  donepezil 5 milliGRAM(s) Oral at bedtime  fludroCORTISONE 0.1 milliGRAM(s) Oral daily  folic acid 1 milliGRAM(s) Oral daily  levothyroxine 100 MICROGram(s) Oral daily  mirtazapine 15 milliGRAM(s) Oral daily  pantoprazole    Tablet 40 milliGRAM(s) Oral every 12 hours    MEDICATIONS  (PRN):  acetaminophen     Tablet .. 650 milliGRAM(s) Oral every 6 hours PRN Temp greater or equal to 38C (100.4F), Mild Pain (1 - 3)    Vital Signs Last 24 Hrs  T(C): 36.9 (2022 05:08), Max: 36.9 (2022 05:08)  T(F): 98.5 (2022 05:08), Max: 98.5 (2022 05:08)  HR: 89 (2022 05:08) (89 - 96)  BP: 143/71 (2022 05:08) (112/58 - 145/71)  BP(mean): --  RR: 18 (2022 05:08) (14 - 18)  SpO2: 97% (2022 05:08) (97% - 100%)     @ 07:01  -   @ 07:00  --------------------------------------------------------  IN: 700 mL / OUT: 400 mL / NET: 300 mL      PHYSICAL EXAM:  General:  female; in no acute distress  HEENT: MMM, conjunctiva and sclera clear  Gastrointestinal: Soft, non-tender non-distended; Normal bowel sounds; No rebound or guarding  Extremities: Normal range of motion, No clubbing, cyanosis or edema  Neurological: Alert and oriented x3  Skin: Warm and dry. No obvious rash    LABS:                        7.7    7.28  )-----------( 246      ( 2022 07:50 )             24.2     04-14    142  |  108  |  21  ----------------------------<  99  4.1   |  25  |  1.47<H>    Ca    8.4      2022 07:50  Phos  4.2     04-14  Mg     2.0     04-14    TPro  5.9<L>  /  Alb  3.7  /  TBili  0.3  /  DBili  x   /  AST  21  /  ALT  21  /  AlkPhos  82  04-13    PT/INR - ( 2022 18:30 )   PT: 11.5 sec;   INR: 0.97          PTT - ( 2022 18:30 )  PTT:23.6 sec      Urinalysis Basic - ( 2022 20:19 )    Color: Yellow / Appearance: Clear / S.010 / pH: x  Gluc: x / Ketone: NEGATIVE  / Bili: Negative / Urobili: 0.2 E.U./dL   Blood: x / Protein: NEGATIVE mg/dL / Nitrite: NEGATIVE   Leuk Esterase: Trace / RBC: < 5 /HPF / WBC 5-10 /HPF   Sq Epi: x / Non Sq Epi: 0-5 /HPF / Bacteria: Rare /HPF                Urinalysis with Rflx Culture (collected 2022 20:19)      RADIOLOGY & ADDITIONAL STUDIES:  Reviewed

## 2022-04-14 NOTE — DISCHARGE NOTE PROVIDER - CARE PROVIDER_API CALL
Armani Talavera  Gastroenterology  [address] [place] [address]  Phone: [phone]  Fax: [phone]  Follow Up Time: 1 week

## 2022-04-14 NOTE — DISCHARGE NOTE PROVIDER - NSDCCPCAREPLAN_GEN_ALL_CORE_FT
PRINCIPAL DISCHARGE DIAGNOSIS  Diagnosis: Symptomatic anemia  Assessment and Plan of Treatment: Anemia is the medical term for when a person has too few red blood cells. Red blood cells are the cells in your blood that carry oxygen. If you have too few red blood cells, your body does not get all the oxygen it needs. Most people with anemia have no symptoms. They find out they have it after their doctor does blood tests for another reason. People who do have symptoms might feel tired or weak, especially if they try to exercise or have headaches. If you experience these symptoms you should see your primary care provider for further evaluation.  In your case you presented to your primary care physician with fatigue and found to have a low hemoglobin. In the hospital the gastroenterology team did an endoscopy and found an ulcer which was the likely source of the bleed. They biopsied the ulcer and you will follow up outpatient for the results. Your blood levels are now stable after receiving a unit of blood, and your fatigue has improved.   We are prescribing iron tablets Ferrous sulfate 325mg which you will take 1 pill every other day. Additionally we are giving you an antacid called pantoprazole which you will take one 40mg tablet twice daily. Please stop taking your aspirin and plavix as they put you at an increased risk of bleeding. If you start to experience worsening fatigue please return or seek medical attention.        SECONDARY DISCHARGE DIAGNOSES  Diagnosis: ALYSHA (acute kidney injury)  Assessment and Plan of Treatment: During this hospital admission you were found to have acute kidney injury. Acute kidney injury (ALYSHA) is a sudden episode of kidney failure or kidney damage that happens within a few hours or a few days. ALYSHA causes a build-up of waste products in your blood and makes it hard for your kidneys to keep the right balance of fluid in your body. Your kidney numbers have been improving. Please follow up with your primary care doctor for further evaluation and testing.  This was likely due to your blood levels being low, which causes your kidneys not to receive as much blood and get injured. Additionally, you received contrast for a CT scan which can cause kidney injury. Please follow up with your primary care provider to draw labs and check your kidney function.       PRINCIPAL DISCHARGE DIAGNOSIS  Diagnosis: Symptomatic anemia  Assessment and Plan of Treatment: Anemia is the medical term for when a person has too few red blood cells. Red blood cells are the cells in your blood that carry oxygen. If you have too few red blood cells, your body does not get all the oxygen it needs. Most people with anemia have no symptoms. They find out they have it after their doctor does blood tests for another reason. People who do have symptoms might feel tired or weak, especially if they try to exercise or have headaches. If you experience these symptoms you should see your primary care provider for further evaluation.  In your case you presented to your primary care physician with fatigue and found to have a low hemoglobin. In the hospital the gastroenterology team did an endoscopy and found an ulcer which was the likely source of the bleed. They biopsied the ulcer and you will follow up outpatient for the results. Your blood levels are now stable after receiving a unit of blood, and your fatigue has improved.   We are prescribing iron tablets Ferrous sulfate 325mg which you will take 1 pill every other day. Additionally we are giving you an antacid called pantoprazole which you will take one 40mg tablet twice daily. Please stop taking your aspirin and plavix as they put you at an increased risk of bleeding. If you start to experience worsening fatigue please return or seek medical attention.        SECONDARY DISCHARGE DIAGNOSES  Diagnosis: ALYSHA (acute kidney injury)  Assessment and Plan of Treatment: During this hospital admission you were found to have acute kidney injury. Acute kidney injury (ALYSHA) is a sudden episode of kidney failure or kidney damage that happens within a few hours or a few days. ALYSHA causes a build-up of waste products in your blood and makes it hard for your kidneys to keep the right balance of fluid in your body. Your kidney numbers have been improving. Please follow up with your primary care doctor for further evaluation and testing.  This was likely due to your blood levels being low, which causes your kidneys not to receive as much blood and get injured. Additionally, you received contrast for a CT scan which can cause kidney injury. Please follow up with your primary care provider to draw labs and check your kidney function.  Additionally, we are holding your blood pressure medication, olmesartan, because this can exacerbate kidney injury. Please closely follow up with your primary care provider Dr. Prado to discuss restarting it.

## 2022-04-15 PROBLEM — Z00.00 ENCOUNTER FOR PREVENTIVE HEALTH EXAMINATION: Status: ACTIVE | Noted: 2022-04-15

## 2022-04-16 LAB — IF BLOCK AB SER-ACNC: 1.1 AU/ML — SIGNIFICANT CHANGE UP (ref 0–1.1)

## 2022-04-18 DIAGNOSIS — Z01.812 ENCOUNTER FOR PREPROCEDURAL LABORATORY EXAMINATION: ICD-10-CM

## 2022-04-18 PROBLEM — C50.919 MALIGNANT NEOPLASM OF UNSPECIFIED SITE OF UNSPECIFIED FEMALE BREAST: Chronic | Status: ACTIVE | Noted: 2022-04-12

## 2022-04-18 PROBLEM — E78.5 HYPERLIPIDEMIA, UNSPECIFIED: Chronic | Status: ACTIVE | Noted: 2022-04-12

## 2022-04-20 ENCOUNTER — NON-APPOINTMENT (OUTPATIENT)
Age: 77
End: 2022-04-20

## 2022-04-21 DIAGNOSIS — K44.9 DIAPHRAGMATIC HERNIA WITHOUT OBSTRUCTION OR GANGRENE: ICD-10-CM

## 2022-04-21 DIAGNOSIS — N17.9 ACUTE KIDNEY FAILURE, UNSPECIFIED: ICD-10-CM

## 2022-04-21 DIAGNOSIS — Z85.3 PERSONAL HISTORY OF MALIGNANT NEOPLASM OF BREAST: ICD-10-CM

## 2022-04-21 DIAGNOSIS — E78.5 HYPERLIPIDEMIA, UNSPECIFIED: ICD-10-CM

## 2022-04-21 DIAGNOSIS — Z79.82 LONG TERM (CURRENT) USE OF ASPIRIN: ICD-10-CM

## 2022-04-21 DIAGNOSIS — D50.9 IRON DEFICIENCY ANEMIA, UNSPECIFIED: ICD-10-CM

## 2022-04-21 DIAGNOSIS — K57.90 DIVERTICULOSIS OF INTESTINE, PART UNSPECIFIED, WITHOUT PERFORATION OR ABSCESS WITHOUT BLEEDING: ICD-10-CM

## 2022-04-21 DIAGNOSIS — Z86.73 PERSONAL HISTORY OF TRANSIENT ISCHEMIC ATTACK (TIA), AND CEREBRAL INFARCTION WITHOUT RESIDUAL DEFICITS: ICD-10-CM

## 2022-04-21 DIAGNOSIS — M27.0 DEVELOPMENTAL DISORDERS OF JAWS: ICD-10-CM

## 2022-04-21 DIAGNOSIS — Z79.02 LONG TERM (CURRENT) USE OF ANTITHROMBOTICS/ANTIPLATELETS: ICD-10-CM

## 2022-04-21 DIAGNOSIS — Z87.891 PERSONAL HISTORY OF NICOTINE DEPENDENCE: ICD-10-CM

## 2022-04-21 DIAGNOSIS — D72.829 ELEVATED WHITE BLOOD CELL COUNT, UNSPECIFIED: ICD-10-CM

## 2022-04-21 DIAGNOSIS — E03.9 HYPOTHYROIDISM, UNSPECIFIED: ICD-10-CM

## 2022-04-21 DIAGNOSIS — I95.1 ORTHOSTATIC HYPOTENSION: ICD-10-CM

## 2022-04-21 DIAGNOSIS — K92.2 GASTROINTESTINAL HEMORRHAGE, UNSPECIFIED: ICD-10-CM

## 2022-04-21 DIAGNOSIS — I10 ESSENTIAL (PRIMARY) HYPERTENSION: ICD-10-CM

## 2022-04-21 DIAGNOSIS — D64.9 ANEMIA, UNSPECIFIED: ICD-10-CM

## 2022-04-21 DIAGNOSIS — F41.8 OTHER SPECIFIED ANXIETY DISORDERS: ICD-10-CM

## 2022-04-21 DIAGNOSIS — K25.9 GASTRIC ULCER, UNSPECIFIED AS ACUTE OR CHRONIC, WITHOUT HEMORRHAGE OR PERFORATION: ICD-10-CM

## 2022-04-26 PROCEDURE — 99285 EMERGENCY DEPT VISIT HI MDM: CPT | Mod: 25

## 2022-04-26 PROCEDURE — 83010 ASSAY OF HAPTOGLOBIN QUANT: CPT

## 2022-04-26 PROCEDURE — P9016: CPT

## 2022-04-26 PROCEDURE — 36415 COLL VENOUS BLD VENIPUNCTURE: CPT

## 2022-04-26 PROCEDURE — 74177 CT ABD & PELVIS W/CONTRAST: CPT

## 2022-04-26 PROCEDURE — 84443 ASSAY THYROID STIM HORMONE: CPT

## 2022-04-26 PROCEDURE — 86340 INTRINSIC FACTOR ANTIBODY: CPT

## 2022-04-26 PROCEDURE — 88305 TISSUE EXAM BY PATHOLOGIST: CPT

## 2022-04-26 PROCEDURE — 82746 ASSAY OF FOLIC ACID SERUM: CPT

## 2022-04-26 PROCEDURE — 85045 AUTOMATED RETICULOCYTE COUNT: CPT

## 2022-04-26 PROCEDURE — 83550 IRON BINDING TEST: CPT

## 2022-04-26 PROCEDURE — 83935 ASSAY OF URINE OSMOLALITY: CPT

## 2022-04-26 PROCEDURE — 84300 ASSAY OF URINE SODIUM: CPT

## 2022-04-26 PROCEDURE — 86803 HEPATITIS C AB TEST: CPT

## 2022-04-26 PROCEDURE — 84100 ASSAY OF PHOSPHORUS: CPT

## 2022-04-26 PROCEDURE — 86901 BLOOD TYPING SEROLOGIC RH(D): CPT

## 2022-04-26 PROCEDURE — 82570 ASSAY OF URINE CREATININE: CPT

## 2022-04-26 PROCEDURE — 86900 BLOOD TYPING SEROLOGIC ABO: CPT

## 2022-04-26 PROCEDURE — 82728 ASSAY OF FERRITIN: CPT

## 2022-04-26 PROCEDURE — 86923 COMPATIBILITY TEST ELECTRIC: CPT

## 2022-04-26 PROCEDURE — 85730 THROMBOPLASTIN TIME PARTIAL: CPT

## 2022-04-26 PROCEDURE — 84466 ASSAY OF TRANSFERRIN: CPT

## 2022-04-26 PROCEDURE — 82272 OCCULT BLD FECES 1-3 TESTS: CPT

## 2022-04-26 PROCEDURE — 87635 SARS-COV-2 COVID-19 AMP PRB: CPT

## 2022-04-26 PROCEDURE — 36430 TRANSFUSION BLD/BLD COMPNT: CPT

## 2022-04-26 PROCEDURE — 85027 COMPLETE CBC AUTOMATED: CPT

## 2022-04-26 PROCEDURE — 84439 ASSAY OF FREE THYROXINE: CPT

## 2022-04-26 PROCEDURE — 83540 ASSAY OF IRON: CPT

## 2022-04-26 PROCEDURE — 83615 LACTATE (LD) (LDH) ENZYME: CPT

## 2022-04-26 PROCEDURE — 71045 X-RAY EXAM CHEST 1 VIEW: CPT

## 2022-04-26 PROCEDURE — 83735 ASSAY OF MAGNESIUM: CPT

## 2022-04-26 PROCEDURE — 86850 RBC ANTIBODY SCREEN: CPT

## 2022-04-26 PROCEDURE — 80053 COMPREHEN METABOLIC PANEL: CPT

## 2022-04-26 PROCEDURE — 80048 BASIC METABOLIC PNL TOTAL CA: CPT

## 2022-04-26 PROCEDURE — 88341 IMHCHEM/IMCYTCHM EA ADD ANTB: CPT

## 2022-04-26 PROCEDURE — 85025 COMPLETE CBC W/AUTO DIFF WBC: CPT

## 2022-04-26 PROCEDURE — 85610 PROTHROMBIN TIME: CPT

## 2022-04-26 PROCEDURE — 82607 VITAMIN B-12: CPT

## 2022-04-26 PROCEDURE — 81001 URINALYSIS AUTO W/SCOPE: CPT

## 2022-04-28 RX ORDER — PANTOPRAZOLE SODIUM 20 MG/1
1 TABLET, DELAYED RELEASE ORAL
Qty: 30 | Refills: 1
Start: 2022-04-28 | End: 2022-06-26

## 2022-05-02 ENCOUNTER — TRANSCRIPTION ENCOUNTER (OUTPATIENT)
Age: 77
End: 2022-05-02

## 2022-05-03 ENCOUNTER — NON-APPOINTMENT (OUTPATIENT)
Age: 77
End: 2022-05-03

## 2022-05-03 LAB — SARS-COV-2 N GENE NPH QL NAA+PROBE: NOT DETECTED

## 2022-05-29 NOTE — ED ADULT NURSE NOTE - TEMPLATE
Throat culture positive for H influenza.  Recommend beta lactams.  Patient will be prescribed cefdinir.  
Abdominal Pain, N/V/D

## 2022-06-08 ENCOUNTER — APPOINTMENT (OUTPATIENT)
Dept: GASTROENTEROLOGY | Facility: HOSPITAL | Age: 77
End: 2022-06-08

## 2022-08-23 NOTE — ASU PATIENT PROFILE, ADULT - FALL HARM RISK - UNIVERSAL INTERVENTIONS
Bed in lowest position, wheels locked, appropriate side rails in place/Call bell, personal items and telephone in reach/Instruct patient to call for assistance before getting out of bed or chair/Non-slip footwear when patient is out of bed/Chunky to call system/Physically safe environment - no spills, clutter or unnecessary equipment/Purposeful Proactive Rounding/Room/bathroom lighting operational, light cord in reach

## 2022-08-24 ENCOUNTER — OUTPATIENT (OUTPATIENT)
Dept: OUTPATIENT SERVICES | Facility: HOSPITAL | Age: 77
LOS: 1 days | Discharge: HOME | End: 2022-08-24

## 2022-08-24 ENCOUNTER — TRANSCRIPTION ENCOUNTER (OUTPATIENT)
Age: 77
End: 2022-08-24

## 2022-08-24 VITALS
WEIGHT: 137.57 LBS | RESPIRATION RATE: 16 BRPM | OXYGEN SATURATION: 99 % | HEART RATE: 84 BPM | HEIGHT: 69 IN | DIASTOLIC BLOOD PRESSURE: 54 MMHG | TEMPERATURE: 98 F | SYSTOLIC BLOOD PRESSURE: 151 MMHG

## 2022-08-24 VITALS
HEART RATE: 76 BPM | OXYGEN SATURATION: 98 % | RESPIRATION RATE: 16 BRPM | SYSTOLIC BLOOD PRESSURE: 116 MMHG | DIASTOLIC BLOOD PRESSURE: 62 MMHG

## 2022-08-24 DIAGNOSIS — Z98.890 OTHER SPECIFIED POSTPROCEDURAL STATES: Chronic | ICD-10-CM

## 2022-08-24 DIAGNOSIS — G31.84 MILD COGNITIVE IMPAIRMENT OF UNCERTAIN OR UNKNOWN ETIOLOGY: ICD-10-CM

## 2022-08-24 PROCEDURE — 90870 ELECTROCONVULSIVE THERAPY: CPT

## 2022-08-24 RX ORDER — SODIUM CHLORIDE 9 MG/ML
1000 INJECTION, SOLUTION INTRAVENOUS
Refills: 0 | Status: DISCONTINUED | OUTPATIENT
Start: 2022-08-24 | End: 2022-08-24

## 2022-08-24 RX ADMIN — SODIUM CHLORIDE 100 MILLILITER(S): 9 INJECTION, SOLUTION INTRAVENOUS at 11:36

## 2022-08-24 NOTE — ASU DISCHARGE PLAN (ADULT/PEDIATRIC) - ASU DC SPECIAL INSTRUCTIONSFT
AMBULATORY ELECTROCONVULSIVE THERAPY (ECT) DISCHARGE INSTRUCTIONS    Condition:  Stable for discharge to home.    1. Activities: Rest the day of your treatment.  It is normal to experience drowsiness and possibly some confusion following ECT and anesthesia.  DO NOT consume alcohol, operative machinery, drive or make important personal, business or legal decisions for at least 24 hours.    2. Diet: Begin with a light meal following ECT and progress as tolerated to a regular diet (ie. resume your normal food intake).    3. Medications: Resume medications as prescribed by your outpatient physicians.  Mild aches or headache may be experienced post treatment.  This is usually relieved  by Tylenol or other non-aspirin medications (take only amounts prescribed by each ).      4. Emergencies:  Please first call your regular outpatient psychiatric provider (ie. psychopharmacologist) for any changes in your psychiatric symptoms.   If you are unable to reach your doctor, you go directly to your local Emergency Room.      5. Routine Follow-Up Information:   Please call the ECT Department if you are unable to make your next appointment or have any questions about your ECT treatments.  You may also contact Femi Fuller MD at [email or web address] or [phone] for non-urgent clinical questions about your treatment course.      REMEMBER TO NOT EAT ANYTHING FOR 8 HOURS BEFORE YOUR NEXT TREATMENT.   YOU MAY DRINK ONLY CLEAR LIQUIDS UP TO 3 HOURS BEFORE YOUR TREATMENT (eg. WATER, APPLE JUICE, GINGER ALE).    SOMEONE OVER 18 YEARS OLD MUST ACCOMPANY YOU HOME FROM THE HOSPITAL ON THE DAY OF YOUR TREATMENT.    IF YOU HAVE ANY QUESTIONS ABOUT YOUR NEXT TREATMENT, COVID TESTING, OR EXPIRATION DATES OF YOUR MEDICAL CLEARANCE, PLEASE CONTACT:     ECT Department:  Jamila Armas” [place] [phone] office; [phone] fax      Your next ECT treatment will be on: 8/26/22 Friday, (also anticipate next week: Mon 9/29 and Wed 8/31)

## 2022-08-24 NOTE — ECT TREATMENT NOTE - NSECTCOMMENTS_PSY_ALL_CORE
Pt continues to report depressed mood with primary symptom being low energy, motivation, apathy.  BDI today 11 (was 41 during intake 2 weeks ago when sister was with her and assisted her with questionnaire). Despite change, pt report mood/low energy is same as 2 weeks ago.  Full initial consult in chart.  H/o etoh abuse but now sober.  Subsequently has new psych MD who has reviewed case and agrees with trial of ECT for acute depression.  Pt understands risks/benefits and has capacity to consent for ECT.   Will begin RUL ECT given h/o MCI confirmed on NeuroPsych testing.  Titration today to determine seizure threshold.        Next tx at 6xST = 48%

## 2022-08-24 NOTE — ASU DISCHARGE PLAN (ADULT/PEDIATRIC) - NS MD DC FALL RISK RISK
For information on Fall & Injury Prevention, visit: [email or web address] OR  [email or web address] OR  https://www.cdc.gov/steadi/patient.html

## 2022-08-25 NOTE — ASU PATIENT PROFILE, ADULT - FALL HARM RISK - UNIVERSAL INTERVENTIONS
Bed in lowest position, wheels locked, appropriate side rails in place/Call bell, personal items and telephone in reach/Instruct patient to call for assistance before getting out of bed or chair/Non-slip footwear when patient is out of bed/[place] to call system/Physically safe environment - no spills, clutter or unnecessary equipment/Purposeful Proactive Rounding/Room/bathroom lighting operational, light cord in reach

## 2022-08-25 NOTE — ASU PATIENT PROFILE, ADULT - NSICDXPASTMEDICALHX_GEN_ALL_CORE_FT
PAST MEDICAL HISTORY:  Breast CA     Cerebrovascular accident (CVA) 1 year ago    Depressive disorder Depression    HLD (hyperlipidemia)     Hypothyroidism Hypothyroidism

## 2022-08-26 ENCOUNTER — OUTPATIENT (OUTPATIENT)
Dept: OUTPATIENT SERVICES | Facility: HOSPITAL | Age: 77
LOS: 1 days | Discharge: HOME | End: 2022-08-26

## 2022-08-26 ENCOUNTER — TRANSCRIPTION ENCOUNTER (OUTPATIENT)
Age: 77
End: 2022-08-26

## 2022-08-26 VITALS
DIASTOLIC BLOOD PRESSURE: 54 MMHG | TEMPERATURE: 98 F | OXYGEN SATURATION: 99 % | SYSTOLIC BLOOD PRESSURE: 151 MMHG | HEART RATE: 84 BPM | RESPIRATION RATE: 16 BRPM

## 2022-08-26 VITALS
SYSTOLIC BLOOD PRESSURE: 147 MMHG | TEMPERATURE: 97 F | RESPIRATION RATE: 16 BRPM | DIASTOLIC BLOOD PRESSURE: 63 MMHG | HEART RATE: 77 BPM | OXYGEN SATURATION: 100 %

## 2022-08-26 DIAGNOSIS — Z98.890 OTHER SPECIFIED POSTPROCEDURAL STATES: Chronic | ICD-10-CM

## 2022-08-26 PROCEDURE — 90870 ELECTROCONVULSIVE THERAPY: CPT

## 2022-08-26 RX ORDER — ONDANSETRON 8 MG/1
4 TABLET, FILM COATED ORAL ONCE
Refills: 0 | Status: DISCONTINUED | OUTPATIENT
Start: 2022-08-26 | End: 2022-08-26

## 2022-08-26 RX ORDER — ACETAMINOPHEN 500 MG
650 TABLET ORAL ONCE
Refills: 0 | Status: DISCONTINUED | OUTPATIENT
Start: 2022-08-26 | End: 2022-08-26

## 2022-08-26 RX ORDER — SODIUM CHLORIDE 9 MG/ML
100 INJECTION, SOLUTION INTRAVENOUS
Refills: 0 | Status: DISCONTINUED | OUTPATIENT
Start: 2022-08-26 | End: 2022-08-26

## 2022-08-26 RX ADMIN — SODIUM CHLORIDE 100 MILLILITER(S): 9 INJECTION, SOLUTION INTRAVENOUS at 10:14

## 2022-08-26 NOTE — ASU PATIENT PROFILE, ADULT - NS PREOP UNDERSTANDS INFO
leave valuables/jewelry/contacts at home, make sure to have escort 18+, bring photo ID, insurance card + covid vax card, no solid foods after midnight, water/apple juice/gatorade until 0630/yes

## 2022-08-26 NOTE — ASU DISCHARGE PLAN (ADULT/PEDIATRIC) - ASU DC SPECIAL INSTRUCTIONSFT
AMBULATORY ELECTROCONVULSIVE THERAPY (ECT) DISCHARGE INSTRUCTIONS    Condition:  Stable for discharge to home.    1. Activities: Rest the day of your treatment.  It is normal to experience drowsiness and possibly some confusion following ECT and anesthesia.  DO NOT consume alcohol, operative machinery, drive or make important personal, business or legal decisions for at least 24 hours.    2. Diet: Begin with a light meal following ECT and progress as tolerated to a regular diet (ie. resume your normal food intake).    3. Medications: Resume medications as prescribed by your outpatient physicians.  Mild aches or headache may be experienced post treatment.  This is usually relieved  by Tylenol or other non-aspirin medications (take only amounts prescribed by each ).      4. Emergencies:  Please first call your regular outpatient psychiatric provider (ie. psychopharmacologist) for any changes in your psychiatric symptoms.   If you are unable to reach your doctor, you go directly to your local Emergency Room.      5. Routine Follow-Up Information:   Please call the ECT Department if you are unable to make your next appointment or have any questions about your ECT treatments.  You may also contact Femi Fuller MD at [email or web address] or [phone] for non-urgent clinical questions about your treatment course.      REMEMBER TO NOT EAT ANYTHING FOR 8 HOURS BEFORE YOUR NEXT TREATMENT.   YOU MAY DRINK ONLY CLEAR LIQUIDS UP TO 3 HOURS BEFORE YOUR TREATMENT (eg. WATER, APPLE JUICE, GINGER ALE).    SOMEONE OVER 18 YEARS OLD MUST ACCOMPANY YOU HOME FROM THE HOSPITAL ON THE DAY OF YOUR TREATMENT.    IF YOU HAVE ANY QUESTIONS ABOUT YOUR NEXT TREATMENT, COVID TESTING, OR EXPIRATION DATES OF YOUR MEDICAL CLEARANCE, PLEASE CONTACT:     ECT Department:  Jamila Armas” [place] [phone] office; [phone] fax      Your next ECT treatment will be on: 8/29/22 Monday  (acute)

## 2022-08-26 NOTE — ECT TREATMENT NOTE - NSECTCOMMENTS_PSY_ALL_CORE
No change in mood.  States "I'm alright" but when asked what activities she could do over weekend states she has no energy/motivation to do anything.  No SI.  Agrees to continue acute course of RUL ECT.  Tolerated 1st procedure well, no side effects reported.

## 2022-08-26 NOTE — ASU DISCHARGE PLAN (ADULT/PEDIATRIC) - NS MD DC FALL RISK RISK
For information on Fall & Injury Prevention, visit: [email or web address] OR  [email or web address] OR  https://www.cdc.gov/steadi/patient.html

## 2022-08-26 NOTE — ASU PATIENT PROFILE, ADULT - FALL HARM RISK - UNIVERSAL INTERVENTIONS
Bed in lowest position, wheels locked, appropriate side rails in place/Call bell, personal items and telephone in reach/Instruct patient to call for assistance before getting out of bed or chair/Non-slip footwear when patient is out of bed/[place] to call system/Physically safe environment - no spills, clutter or unnecessary equipment/Purposeful Proactive Rounding/Room/bathroom lighting operational, light cord in reach

## 2022-08-26 NOTE — PACU DISCHARGE NOTE - NSPTMEETSDISCHCRITERIADT_GEN_A_CORE
----- Message from Dominique Chen 26-Aug-2022 10:26

## 2022-08-30 PROBLEM — I63.9 CEREBRAL INFARCTION, UNSPECIFIED: Chronic | Status: ACTIVE | Noted: 2022-04-12

## 2022-08-31 ENCOUNTER — OUTPATIENT (OUTPATIENT)
Dept: OUTPATIENT SERVICES | Facility: HOSPITAL | Age: 77
LOS: 1 days | Discharge: HOME | End: 2022-08-31

## 2022-08-31 ENCOUNTER — TRANSCRIPTION ENCOUNTER (OUTPATIENT)
Age: 77
End: 2022-08-31

## 2022-08-31 VITALS
DIASTOLIC BLOOD PRESSURE: 65 MMHG | RESPIRATION RATE: 17 BRPM | SYSTOLIC BLOOD PRESSURE: 145 MMHG | HEART RATE: 78 BPM | TEMPERATURE: 98 F | OXYGEN SATURATION: 98 %

## 2022-08-31 VITALS
DIASTOLIC BLOOD PRESSURE: 70 MMHG | HEIGHT: 69 IN | RESPIRATION RATE: 16 BRPM | WEIGHT: 141.1 LBS | SYSTOLIC BLOOD PRESSURE: 150 MMHG | HEART RATE: 89 BPM | OXYGEN SATURATION: 99 % | TEMPERATURE: 98 F

## 2022-08-31 DIAGNOSIS — Z98.890 OTHER SPECIFIED POSTPROCEDURAL STATES: Chronic | ICD-10-CM

## 2022-08-31 PROCEDURE — 90870 ELECTROCONVULSIVE THERAPY: CPT

## 2022-08-31 RX ORDER — SODIUM CHLORIDE 9 MG/ML
1000 INJECTION, SOLUTION INTRAVENOUS
Refills: 0 | Status: DISCONTINUED | OUTPATIENT
Start: 2022-08-31 | End: 2022-08-31

## 2022-08-31 RX ORDER — ACETAMINOPHEN 500 MG
650 TABLET ORAL ONCE
Refills: 0 | Status: DISCONTINUED | OUTPATIENT
Start: 2022-08-31 | End: 2022-08-31

## 2022-08-31 NOTE — ASU DISCHARGE PLAN (ADULT/PEDIATRIC) - ASU DC SPECIAL INSTRUCTIONSFT
AMBULATORY ELECTROCONVULSIVE THERAPY (ECT) DISCHARGE INSTRUCTIONS    Condition:  Stable for discharge to home.    1. Activities: Rest the day of your treatment.  It is normal to experience drowsiness and possibly some confusion following ECT and anesthesia.  DO NOT consume alcohol, operative machinery, drive or make important personal, business or legal decisions for at least 24 hours.    2. Diet: Begin with a light meal following ECT and progress as tolerated to a regular diet (ie. resume your normal food intake).    3. Medications: Resume medications as prescribed by your outpatient physicians.  Mild aches or headache may be experienced post treatment.  This is usually relieved  by Tylenol or other non-aspirin medications (take only amounts prescribed by each ).      4. Emergencies:  Please first call your regular outpatient psychiatric provider (ie. psychopharmacologist) for any changes in your psychiatric symptoms.   If you are unable to reach your doctor, you go directly to your local Emergency Room.      5. Routine Follow-Up Information:   Please call the ECT Department if you are unable to make your next appointment or have any questions about your ECT treatments.  You may also contact Femi Fuller MD at [email or web address] or [phone] for non-urgent clinical questions about your treatment course.      REMEMBER TO NOT EAT ANYTHING FOR 8 HOURS BEFORE YOUR NEXT TREATMENT.   YOU MAY DRINK ONLY CLEAR LIQUIDS UP TO 3 HOURS BEFORE YOUR TREATMENT (eg. WATER, APPLE JUICE, GINGER ALE).    SOMEONE OVER 18 YEARS OLD MUST ACCOMPANY YOU HOME FROM THE HOSPITAL ON THE DAY OF YOUR TREATMENT.    IF YOU HAVE ANY QUESTIONS ABOUT YOUR NEXT TREATMENT, COVID TESTING, OR EXPIRATION DATES OF YOUR MEDICAL CLEARANCE, PLEASE CONTACT:     ECT Department:  Jamila Armas” [place] [phone] office; [phone] fax    Your next ECT treatment will be on: Friday 9/2/22 (acute)

## 2022-08-31 NOTE — ECT TREATMENT NOTE - NSECTCOMMENTS_PSY_ALL_CORE
Mood is "ok".  BDI score low at 8 but still with "flat affect, low energy".   Going to see  this weekend.   Will f/u with  as collateral and outpt psych MD for eval of progress.  Pt's ability to have deeper insight into symptoms may be limited by MCI.   States working with RN as her aide is going well.  Continue acute ECT for now.   Tolerating ECT well, no side effects reported, AOx3.  Mood is "ok".  BDI score low at 8 but still with "flat affect, low energy".   Going to see  this weekend.   Will f/u with  as collateral and outpt psych MD for eval of progress.  Pt's ability to have deeper insight into symptoms may be limited by MCI (seen reading book, able to give basic info on story but not details).  States working with RN as her aide is going well.  Continue acute ECT for now.   Tolerating ECT well, no side effects reported, AOx3.

## 2022-09-02 ENCOUNTER — TRANSCRIPTION ENCOUNTER (OUTPATIENT)
Age: 77
End: 2022-09-02

## 2022-09-02 ENCOUNTER — OUTPATIENT (OUTPATIENT)
Dept: OUTPATIENT SERVICES | Facility: HOSPITAL | Age: 77
LOS: 1 days | Discharge: HOME | End: 2022-09-02

## 2022-09-02 VITALS
TEMPERATURE: 97 F | RESPIRATION RATE: 16 BRPM | HEART RATE: 74 BPM | SYSTOLIC BLOOD PRESSURE: 151 MMHG | DIASTOLIC BLOOD PRESSURE: 65 MMHG | OXYGEN SATURATION: 99 %

## 2022-09-02 VITALS
WEIGHT: 141.54 LBS | SYSTOLIC BLOOD PRESSURE: 157 MMHG | RESPIRATION RATE: 16 BRPM | DIASTOLIC BLOOD PRESSURE: 90 MMHG | HEIGHT: 69 IN | HEART RATE: 88 BPM | TEMPERATURE: 99 F

## 2022-09-02 DIAGNOSIS — Z98.890 OTHER SPECIFIED POSTPROCEDURAL STATES: Chronic | ICD-10-CM

## 2022-09-02 PROCEDURE — 90870 ELECTROCONVULSIVE THERAPY: CPT

## 2022-09-02 RX ORDER — ACETAMINOPHEN 500 MG
650 TABLET ORAL ONCE
Refills: 0 | Status: DISCONTINUED | OUTPATIENT
Start: 2022-09-02 | End: 2022-09-02

## 2022-09-02 NOTE — ECT TREATMENT NOTE - NSECTCOMMENTS_PSY_ALL_CORE
Mood continues to be "ok".   States no change from last treatment.   "maybe a little better than before".  Still with low energy and motivation.   Tolerating ECT well, no side effects reported, AOx3.     If no change at next appt, consider inc energy. Mood continues to be "ok".   States no change from last treatment.   "maybe a little better than before".  Still with low energy and motivation.   Tolerating ECT well, no side effects reported, AOx3.     Inc energy at next visit given shortening of seizure duration.

## 2022-09-02 NOTE — ASU DISCHARGE PLAN (ADULT/PEDIATRIC) - ASU DC SPECIAL INSTRUCTIONSFT
AMBULATORY ELECTROCONVULSIVE THERAPY (ECT) DISCHARGE INSTRUCTIONS    Condition:  Stable for discharge to home.    1. Activities: Rest the day of your treatment.  It is normal to experience drowsiness and possibly some confusion following ECT and anesthesia.  DO NOT consume alcohol, operative machinery, drive or make important personal, business or legal decisions for at least 24 hours.    2. Diet: Begin with a light meal following ECT and progress as tolerated to a regular diet (ie. resume your normal food intake).    3. Medications: Resume medications as prescribed by your outpatient physicians.  Mild aches or headache may be experienced post treatment.  This is usually relieved  by Tylenol or other non-aspirin medications (take only amounts prescribed by each ).      4. Emergencies:  Please first call your regular outpatient psychiatric provider (ie. psychopharmacologist) for any changes in your psychiatric symptoms.   If you are unable to reach your doctor, you go directly to your local Emergency Room.      5. Routine Follow-Up Information:   Please call the ECT Department if you are unable to make your next appointment or have any questions about your ECT treatments.  You may also contact Femi Fuller MD at [email or web address] or [phone] for non-urgent clinical questions about your treatment course.      REMEMBER TO NOT EAT ANYTHING FOR 8 HOURS BEFORE YOUR NEXT TREATMENT.   YOU MAY DRINK ONLY CLEAR LIQUIDS UP TO 3 HOURS BEFORE YOUR TREATMENT (eg. WATER, APPLE JUICE, GINGER ALE).    SOMEONE OVER 18 YEARS OLD MUST ACCOMPANY YOU HOME FROM THE HOSPITAL ON THE DAY OF YOUR TREATMENT.    IF YOU HAVE ANY QUESTIONS ABOUT YOUR NEXT TREATMENT, COVID TESTING, OR EXPIRATION DATES OF YOUR MEDICAL CLEARANCE, PLEASE CONTACT:     ECT Department:  Jamila Armas” [place] [phone] office; [phone] fax      Your next ECT treatment will be on: Wednesday September 7th, 2022

## 2022-09-02 NOTE — ASU DISCHARGE PLAN (ADULT/PEDIATRIC) - HISTORY OF COVID-19 VACCINATION
Patient presents for total skin exam    -Site:  Left dorsal wrist  Duration:  months  Treatment:  no  Itching:  no  Bleeding:  no  Changes in shape, color, size, other:  Unsure      -Site:  Right dorsal forearm  Duration:  recently  Treatment:  no  Itching:  no  Bleeding:  no  Changes in shape, color, size, other:  No      -Site:  Right heel  Duration:  years  Treatment:  medi plast without clearing  Itching:  no  Bleeding:  no  Changes in shape, color, size, other:  Sensitive and tender with weight bearing      -Site:  Rash under abdomen and inguinal creases  Duration:  Noted lately  Treatment:  no  Itching:  no  Bleeding:  no  Changes in shape, color, size, other:  no      ROS:   no history of xs bleeding, bleeding disorders to preclude biopsy    She notes no other new or changing lesions.      PMH:       No history of skin cancer.      Past Medical History:   Diagnosis Date   • Allergic rhinitis due to pollen     Has had immunotherapy in the past   • Calculus of gallbladder without mention of cholecystitis or obstruction    • Diverticulitis of colon (without mention of hemorrhage)(562.11) 6/22/2009   • Migraine, unspecified, without mention of intractable migraine without mention of status migrainosus     Migraine   • Nystagmus, unspecified    • Obesity, unspecified     Obesity   • Osteoarthrosis, unspecified whether generalized or localized, lower leg     left patello femoral DJD          FMH: family history of basal cell carcinoma and squamous cell carcinoma. No melanoma      PE-- WDWF in NAD   Alert and oriented times three.   Well developed, well nourished.   Appropriate affect.    Exam of face/scalp, neck, chest, bilateral upper extremities, hands and digits, abdomen, back, buttocks, groin/external genitalia area, bilateral lower extremities, and feet.      A/P-     Intertrigo  Erythematous patches under abdomen and inguinal creases, not annular  Meaning of diagnosis discussed.  Natural history reviewed,  Vaccine status unknown tendency to recurrence, relationship to body fold.   Wants tx   -Loprox-hydrocortisone cream bid prn.   Use discussed.   -Discussed possible side effects of chronic steroid use, including skin thinning, striae formation, acne like condition.  Use only to affected areas until smooth and flat then discontinue.  Rash noted to oticrx in 2005, has used other topical cortisones in past without problem  -Wash and dry affected areas well each day.  -Zeasorb AF powder for maintenance.      -Call prn persistence of rash beyond few weeks, frequent recurrence, and needing to use the medication frequently to keep it away.        Wart, right heel  Flesh-colored verrucous papule at site of concern.   Meaning of diagnosis discussed.  Discussed warts are caused by viruses and frequently recur.  Natural history of warts reviewed, possible spontaneous resolution, possible spread.  Discussed multiple treatments often required. Discussed treatment options including no treatment, topical treatments, injection treatments, or destructive treatments such as Liquid Nitrogen, laser, or cantharidin.  Risks and benefits discussed.   She stated understanding, opted for topical  Mediplast daily.  Use discussed.   Change daily, wash with soap and water between changes.  Discontinue and call if redness, swelling, drainage, pain occurs.        Actinic keratoses, left dorsal wrist  Gritty, pink papule  Meaning of diagnosis discussed.   Natural history reviewed.  Discussed liquid nitrogen cryotherapy vs topical treatment, clinical follow up--she states understanding, opts for liquid nitrogen cryotherapy.  We discussed possible formation of blisters or open sore formation, swelling, redness, recurrence, infection, permanent scar, and permanent light or dark discoloration.  she stated understanding and gave verbal consent for treatment.  Destroyed with liquid nitrogen.  Total lesions treated:  1.  Care instructions given.  Call if persists,  recurs.      Seborrheic keratosis, right dorsal forearm  Thin, light brown, stuck on papule with increased skin markings on surface at site of concern  Meaning of diagnosis discussed.   Declined tx.   Follow.  Call if changes.        Nevus vs lentigo, right forearm  Dark brown round macule  no notable changes compared with  Photo taken 2/15/17  Findings discussed.   Follow  Call if changes        Freckles  Tan macules in sun exposed areas  meaning of diagnosis discussed   Follow  Call if changes        Seborrehic keratosis vs lentigo, R jawline  Tan-pink thin papule  no notable changes compared with Photo taken 12/10/13.   Findings discussed.    Follow  Call prn changes.       History of capillaritis, lower extremities   Call prn flares.       Dermatofibroma vs other, L medial lower leg   Symmetrical firm pinkish-hyperpigmented round dermal papule with dimple sign with lateral compression   She opts for clinical follow up    Check periodically for changes in shape, color or size and call if this occurs.      Benign-appearing nevi  brown macules and papules, uniformly pigmented and symmetrical  meaning of diagnosis discussed    Follow  Call if change      Lentigo  brown macules scattered in photodistribution areas  meaning of diagnosis discussed    Follow  Call if change       Seborrheic Keratoses  thin light brown stuck on papules with increased skin markings on surface   Stuck-on hyperkeratotic, waxy papules  meaning of diagnosis discussed    Follow  Call if change      Angioma  Uniform dome-shaped red papules  meaning of diagnosis discussed    Follow  Call if change        ABCD'S of pigmented lesions and technique of skin self exam reviewed and given the AAD information on this. call for any new or changing pigmented growths or persistent red growths.   Sun avoidance, protective clothing, and the use of 30-SPF sunscreens is advised.   Recommend she check her spots periodically for changes in shape, color or size  and call if this occurs.       Recommend skin check one year, sooner for problems.      She states understanding of above, had no questions.         On 8/27/2019, Duc Heredia scribed the services personally performed by   Dr Nova Shin MD       I, Dr. Shin attest that I saw and examined the patient and agree with the above documentation as scribed.

## 2022-09-06 DIAGNOSIS — E78.5 HYPERLIPIDEMIA, UNSPECIFIED: ICD-10-CM

## 2022-09-06 DIAGNOSIS — I25.10 ATHEROSCLEROTIC HEART DISEASE OF NATIVE CORONARY ARTERY WITHOUT ANGINA PECTORIS: ICD-10-CM

## 2022-09-06 DIAGNOSIS — G31.84 MILD COGNITIVE IMPAIRMENT OF UNCERTAIN OR UNKNOWN ETIOLOGY: ICD-10-CM

## 2022-09-06 DIAGNOSIS — I49.3 VENTRICULAR PREMATURE DEPOLARIZATION: ICD-10-CM

## 2022-09-06 DIAGNOSIS — F33.2 MAJOR DEPRESSIVE DISORDER, RECURRENT SEVERE WITHOUT PSYCHOTIC FEATURES: ICD-10-CM

## 2022-09-06 DIAGNOSIS — Z86.73 PERSONAL HISTORY OF TRANSIENT ISCHEMIC ATTACK (TIA), AND CEREBRAL INFARCTION WITHOUT RESIDUAL DEFICITS: ICD-10-CM

## 2022-09-06 DIAGNOSIS — E03.9 HYPOTHYROIDISM, UNSPECIFIED: ICD-10-CM

## 2022-09-06 DIAGNOSIS — Z87.891 PERSONAL HISTORY OF NICOTINE DEPENDENCE: ICD-10-CM

## 2022-09-06 DIAGNOSIS — Z85.3 PERSONAL HISTORY OF MALIGNANT NEOPLASM OF BREAST: ICD-10-CM

## 2022-09-06 NOTE — ASU PATIENT PROFILE, ADULT - NSICDXPASTSURGICALHX_GEN_ALL_CORE_FT
PAST SURGICAL HISTORY:  History of ankle surgery left    S/P breast lumpectomy Right    S/P ECT (electroconvulsive therapy)

## 2022-09-06 NOTE — ASU PATIENT PROFILE, ADULT - FALL HARM RISK - HARM RISK INTERVENTIONS

## 2022-09-07 ENCOUNTER — OUTPATIENT (OUTPATIENT)
Dept: OUTPATIENT SERVICES | Facility: HOSPITAL | Age: 77
LOS: 1 days | Discharge: HOME | End: 2022-09-07

## 2022-09-07 ENCOUNTER — TRANSCRIPTION ENCOUNTER (OUTPATIENT)
Age: 77
End: 2022-09-07

## 2022-09-07 VITALS
SYSTOLIC BLOOD PRESSURE: 155 MMHG | RESPIRATION RATE: 14 BRPM | OXYGEN SATURATION: 97 % | DIASTOLIC BLOOD PRESSURE: 75 MMHG | HEART RATE: 85 BPM

## 2022-09-07 VITALS
WEIGHT: 142.86 LBS | RESPIRATION RATE: 16 BRPM | HEIGHT: 69 IN | OXYGEN SATURATION: 100 % | SYSTOLIC BLOOD PRESSURE: 184 MMHG | TEMPERATURE: 98 F | HEART RATE: 84 BPM | DIASTOLIC BLOOD PRESSURE: 78 MMHG

## 2022-09-07 DIAGNOSIS — Z98.890 OTHER SPECIFIED POSTPROCEDURAL STATES: Chronic | ICD-10-CM

## 2022-09-07 LAB — SARS-COV-2 N GENE NPH QL NAA+PROBE: NOT DETECTED

## 2022-09-07 PROCEDURE — 90870 ELECTROCONVULSIVE THERAPY: CPT

## 2022-09-07 RX ORDER — ACETAMINOPHEN 500 MG
650 TABLET ORAL ONCE
Refills: 0 | Status: DISCONTINUED | OUTPATIENT
Start: 2022-09-07 | End: 2022-09-07

## 2022-09-07 RX ORDER — SODIUM CHLORIDE 9 MG/ML
500 INJECTION, SOLUTION INTRAVENOUS
Refills: 0 | Status: DISCONTINUED | OUTPATIENT
Start: 2022-09-07 | End: 2022-09-07

## 2022-09-07 RX ORDER — ONDANSETRON 8 MG/1
4 TABLET, FILM COATED ORAL ONCE
Refills: 0 | Status: DISCONTINUED | OUTPATIENT
Start: 2022-09-07 | End: 2022-09-07

## 2022-09-07 NOTE — ECT TREATMENT NOTE - NSECTCOMMENTS_PSY_ALL_CORE
Pt states she is doing well.  "Did nothing over weekend".  Denies issue with depression but then states maybe energy/motivation is low.  Will go see  this upcoming weekend.   AOx3, denies worsening cog side effects (but has MCI at baseline making her a poor historian).  Message left for outpt psych MD impression of status/progress with ECT.

## 2022-09-07 NOTE — ASU DISCHARGE PLAN (ADULT/PEDIATRIC) - NS MD DC FALL RISK RISK
For information on Fall & Injury Prevention, visit: [email or web address] OR  [email or web address] OR  https://www.cdc.gov/steadi/patient.html

## 2022-09-07 NOTE — ASU DISCHARGE PLAN (ADULT/PEDIATRIC) - ASU DC SPECIAL INSTRUCTIONSFT
AMBULATORY ELECTROCONVULSIVE THERAPY (ECT) DISCHARGE INSTRUCTIONS    Condition:  Stable for discharge to home.    1. Activities: Rest the day of your treatment.  It is normal to experience drowsiness and possibly some confusion following ECT and anesthesia.  DO NOT consume alcohol, operative machinery, drive or make important personal, business or legal decisions for at least 24 hours.    2. Diet: Begin with a light meal following ECT and progress as tolerated to a regular diet (ie. resume your normal food intake).    3. Medications: Resume medications as prescribed by your outpatient physicians.  Mild aches or headache may be experienced post treatment.  This is usually relieved  by Tylenol or other non-aspirin medications (take only amounts prescribed by each ).      4. Emergencies:  Please first call your regular outpatient psychiatric provider (ie. psychopharmacologist) for any changes in your psychiatric symptoms.   If you are unable to reach your doctor, you go directly to your local Emergency Room.      5. Routine Follow-Up Information:   Please call the ECT Department if you are unable to make your next appointment or have any questions about your ECT treatments.  You may also contact Femi Fuller MD at [email or web address] or [phone] for non-urgent clinical questions about your treatment course.      REMEMBER TO NOT EAT ANYTHING FOR 8 HOURS BEFORE YOUR NEXT TREATMENT.   YOU MAY DRINK ONLY CLEAR LIQUIDS UP TO 3 HOURS BEFORE YOUR TREATMENT (eg. WATER, APPLE JUICE, GINGER ALE).    SOMEONE OVER 18 YEARS OLD MUST ACCOMPANY YOU HOME FROM THE HOSPITAL ON THE DAY OF YOUR TREATMENT.    IF YOU HAVE ANY QUESTIONS ABOUT YOUR NEXT TREATMENT, COVID TESTING, OR EXPIRATION DATES OF YOUR MEDICAL CLEARANCE, PLEASE CONTACT:     ECT Department:  Jamila Armas” [place] [phone] office; [phone] fax      Your next ECT treatment will be on: Friday 9/12/22 (acute)

## 2022-09-07 NOTE — ECT TREATMENT NOTE - BP NONINVASIVE SYSTOLIC (MM HG)
What Is The Reason For Today's Visit?: Follow Up Non-Melanoma Skin Cancer How Many Skin Cancers Have You Had?: more than one Additional History: Pt sun protects and takes a vitamin d supplement.  He has asx areas all over to have observed today, he describes these areas as rough and dry.  No pain or bleeding, and no prior treatment. When Was Your Last Cancer Diagnosed?: 2013 184

## 2022-09-09 ENCOUNTER — TRANSCRIPTION ENCOUNTER (OUTPATIENT)
Age: 77
End: 2022-09-09

## 2022-09-09 ENCOUNTER — OUTPATIENT (OUTPATIENT)
Dept: OUTPATIENT SERVICES | Facility: HOSPITAL | Age: 77
LOS: 1 days | Discharge: HOME | End: 2022-09-09

## 2022-09-09 VITALS
HEART RATE: 84 BPM | TEMPERATURE: 98 F | OXYGEN SATURATION: 97 % | RESPIRATION RATE: 14 BRPM | SYSTOLIC BLOOD PRESSURE: 152 MMHG | DIASTOLIC BLOOD PRESSURE: 67 MMHG

## 2022-09-09 VITALS
TEMPERATURE: 99 F | RESPIRATION RATE: 16 BRPM | SYSTOLIC BLOOD PRESSURE: 116 MMHG | WEIGHT: 141.1 LBS | HEIGHT: 69 IN | DIASTOLIC BLOOD PRESSURE: 75 MMHG | OXYGEN SATURATION: 99 % | HEART RATE: 97 BPM

## 2022-09-09 DIAGNOSIS — Z98.890 OTHER SPECIFIED POSTPROCEDURAL STATES: Chronic | ICD-10-CM

## 2022-09-09 PROCEDURE — 90870 ELECTROCONVULSIVE THERAPY: CPT

## 2022-09-09 RX ORDER — SODIUM CHLORIDE 9 MG/ML
1000 INJECTION, SOLUTION INTRAVENOUS
Refills: 0 | Status: DISCONTINUED | OUTPATIENT
Start: 2022-09-09 | End: 2022-09-23

## 2022-09-09 RX ORDER — ONDANSETRON 8 MG/1
4 TABLET, FILM COATED ORAL ONCE
Refills: 0 | Status: DISCONTINUED | OUTPATIENT
Start: 2022-09-09 | End: 2022-09-23

## 2022-09-09 RX ORDER — ACETAMINOPHEN 500 MG
1000 TABLET ORAL ONCE
Refills: 0 | Status: DISCONTINUED | OUTPATIENT
Start: 2022-09-09 | End: 2022-09-23

## 2022-09-09 NOTE — PRE-ANESTHESIA EVALUATION ADULT - NSANTHOSAYNRD_GEN_A_CORE
----- Message from Roma Horta sent at 11/27/2018  4:51 PM EST -----  Regarding: KEYSHAWN Steven/refill  Contact: [phone]  Rolando Marquez with [facility] advises that she was trying to fax over two refill requests with no success. She does have correct fax number. She is requesting refills for \"amlodipinie 5mg\" and \"losartan\".
No. PADMINI screening performed.  STOP BANG Legend: 0-2 = LOW Risk; 3-4 = INTERMEDIATE Risk; 5-8 = HIGH Risk
No. PADMINI screening performed.  STOP BANG Legend: 0-2 = LOW Risk; 3-4 = INTERMEDIATE Risk; 5-8 = HIGH Risk

## 2022-09-09 NOTE — ECT TREATMENT NOTE - NSECTCOMMENTS_PSY_ALL_CORE
Continues to say she is "ok".   Aware I spoke to outpt MD who feels she has improved and rec 2 more acute tx next week and if stable, considering taper.   Pt states she isn't sure if her energy is back to baseline but now can feel "anger" and have some emotion to environment.  Going to see  this weekend which in the past has been a stressor.  Anticipate 2x/week ECT next week and if stable taper.  No new cognitive side effects reported.  Agrees with plan.

## 2022-09-09 NOTE — ASU DISCHARGE PLAN (ADULT/PEDIATRIC) - NS MD DC FALL RISK RISK
For information on Fall & Injury Prevention, visit: [email or web address]://www.[facility]/news/fall-prevention-protects-and-maintains-health-and-mobility OR  https://www.[facility]/news/fall-prevention-tips-to-avoid-injury OR  https://www.cdc.gov/steadi/patient.html

## 2022-09-09 NOTE — ASU DISCHARGE PLAN (ADULT/PEDIATRIC) - ASU DC SPECIAL INSTRUCTIONSFT
AMBULATORY ELECTROCONVULSIVE THERAPY (ECT) DISCHARGE INSTRUCTIONS    Condition:  Stable for discharge to home.    1. Activities: Rest the day of your treatment.  It is normal to experience drowsiness and possibly some confusion following ECT and anesthesia.  DO NOT consume alcohol, operative machinery, drive or make important personal, business or legal decisions for at least 24 hours.    2. Diet: Begin with a light meal following ECT and progress as tolerated to a regular diet (ie. resume your normal food intake).    3. Medications: Resume medications as prescribed by your outpatient physicians.  Mild aches or headache may be experienced post treatment.  This is usually relieved  by Tylenol or other non-aspirin medications (take only amounts prescribed by each ).      4. Emergencies:  Please first call your regular outpatient psychiatric provider (ie. psychopharmacologist) for any changes in your psychiatric symptoms.   If you are unable to reach your doctor, you go directly to your local Emergency Room.      5. Routine Follow-Up Information:   Please call the ECT Department if you are unable to make your next appointment or have any questions about your ECT treatments.  You may also contact Femi Fuller MD at [email or web address] or [phone] for non-urgent clinical questions about your treatment course.      REMEMBER TO NOT EAT ANYTHING FOR 8 HOURS BEFORE YOUR NEXT TREATMENT.   YOU MAY DRINK ONLY CLEAR LIQUIDS UP TO 3 HOURS BEFORE YOUR TREATMENT (eg. WATER, APPLE JUICE, GINGER ALE).    SOMEONE OVER 18 YEARS OLD MUST ACCOMPANY YOU HOME FROM THE HOSPITAL ON THE DAY OF YOUR TREATMENT.    IF YOU HAVE ANY QUESTIONS ABOUT YOUR NEXT TREATMENT, COVID TESTING, OR EXPIRATION DATES OF YOUR MEDICAL CLEARANCE, PLEASE CONTACT:     ECT Department:  Jamila [facility]as[facility] [place] [phone] office; [phone] fax    Your next ECT treatment will be on: Monday September 12, 2022 (acute)

## 2022-09-12 DIAGNOSIS — F33.2 MAJOR DEPRESSIVE DISORDER, RECURRENT SEVERE WITHOUT PSYCHOTIC FEATURES: ICD-10-CM

## 2022-09-12 DIAGNOSIS — F17.200 NICOTINE DEPENDENCE, UNSPECIFIED, UNCOMPLICATED: ICD-10-CM

## 2022-09-12 DIAGNOSIS — Z85.3 PERSONAL HISTORY OF MALIGNANT NEOPLASM OF BREAST: ICD-10-CM

## 2022-09-12 DIAGNOSIS — Z86.73 PERSONAL HISTORY OF TRANSIENT ISCHEMIC ATTACK (TIA), AND CEREBRAL INFARCTION WITHOUT RESIDUAL DEFICITS: ICD-10-CM

## 2022-09-12 DIAGNOSIS — E78.5 HYPERLIPIDEMIA, UNSPECIFIED: ICD-10-CM

## 2022-09-12 DIAGNOSIS — E03.9 HYPOTHYROIDISM, UNSPECIFIED: ICD-10-CM

## 2022-09-13 NOTE — ASU PATIENT PROFILE, ADULT - FALL HARM RISK - UNIVERSAL INTERVENTIONS
Bed in lowest position, wheels locked, appropriate side rails in place/Call bell, personal items and telephone in reach/Instruct patient to call for assistance before getting out of bed or chair/Non-slip footwear when patient is out of bed/[place] to call system/Physically safe environment - no spills, clutter or unnecessary equipment/Purposeful Proactive Rounding/Room/bathroom lighting operational, light cord in reach Bed in lowest position, wheels locked, appropriate side rails in place/Call bell, personal items and telephone in reach/Instruct patient to call for assistance before getting out of bed or chair/Non-slip footwear when patient is out of bed/[place] to call system/Physically safe environment - no spills, clutter or unnecessary equipment/Purposeful Proactive Rounding/Room/bathroom lighting operational, light cord in reach

## 2022-09-13 NOTE — ASU PATIENT PROFILE, ADULT - SITE
May 2, 2019      [facility]  [address] [address]  Phone: [phone]  Fax: [phone]       Patient: Stephen Fernandez   YOB: 1987  Date of Visit: 05/02/2019    To Whom It May Concern:    Obey Fernandez  was at [facility] on 05/02/2019. He may return to work/school on 5/3/19 with no restrictions. If you have any questions or concerns, or if I can be of further assistance, please do not hesitate to contact me.    Sincerely,    Abida Yates PA-C     
ECT

## 2022-09-14 RX ORDER — ONDANSETRON 8 MG/1
4 TABLET, FILM COATED ORAL ONCE
Refills: 0 | Status: DISCONTINUED | OUTPATIENT
Start: 2022-09-23 | End: 2022-09-23

## 2022-09-14 RX ORDER — ACETAMINOPHEN 500 MG
650 TABLET ORAL ONCE
Refills: 0 | Status: DISCONTINUED | OUTPATIENT
Start: 2022-09-23 | End: 2022-09-23

## 2022-09-14 RX ORDER — SODIUM CHLORIDE 9 MG/ML
500 INJECTION, SOLUTION INTRAVENOUS
Refills: 0 | Status: DISCONTINUED | OUTPATIENT
Start: 2022-09-23 | End: 2022-09-23

## 2022-09-19 ENCOUNTER — OUTPATIENT (OUTPATIENT)
Dept: OUTPATIENT SERVICES | Facility: HOSPITAL | Age: 77
LOS: 1 days | Discharge: HOME | End: 2022-09-19

## 2022-09-19 ENCOUNTER — TRANSCRIPTION ENCOUNTER (OUTPATIENT)
Age: 77
End: 2022-09-19

## 2022-09-19 VITALS
HEART RATE: 96 BPM | TEMPERATURE: 98 F | DIASTOLIC BLOOD PRESSURE: 68 MMHG | OXYGEN SATURATION: 97 % | SYSTOLIC BLOOD PRESSURE: 155 MMHG | WEIGHT: 141.54 LBS | RESPIRATION RATE: 16 BRPM | HEIGHT: 68 IN

## 2022-09-19 VITALS
OXYGEN SATURATION: 100 % | HEART RATE: 82 BPM | RESPIRATION RATE: 18 BRPM | TEMPERATURE: 97 F | SYSTOLIC BLOOD PRESSURE: 152 MMHG | DIASTOLIC BLOOD PRESSURE: 73 MMHG

## 2022-09-19 DIAGNOSIS — Z98.890 OTHER SPECIFIED POSTPROCEDURAL STATES: Chronic | ICD-10-CM

## 2022-09-19 PROCEDURE — 90870 ELECTROCONVULSIVE THERAPY: CPT

## 2022-09-19 RX ORDER — ACETAMINOPHEN 500 MG
650 TABLET ORAL ONCE
Refills: 0 | Status: DISCONTINUED | OUTPATIENT
Start: 2022-09-19 | End: 2022-09-19

## 2022-09-19 RX ORDER — DONEPEZIL HYDROCHLORIDE 10 MG/1
1 TABLET, FILM COATED ORAL
Qty: 0 | Refills: 0 | DISCHARGE

## 2022-09-19 RX ORDER — ACETAMINOPHEN 500 MG
2 TABLET ORAL
Qty: 0 | Refills: 0 | DISCHARGE
Start: 2022-09-19

## 2022-09-19 RX ORDER — SODIUM CHLORIDE 9 MG/ML
500 INJECTION, SOLUTION INTRAVENOUS
Refills: 0 | Status: DISCONTINUED | OUTPATIENT
Start: 2022-09-19 | End: 2022-09-19

## 2022-09-19 RX ORDER — ONDANSETRON 8 MG/1
4 TABLET, FILM COATED ORAL ONCE
Refills: 0 | Status: DISCONTINUED | OUTPATIENT
Start: 2022-09-19 | End: 2022-09-19

## 2022-09-19 NOTE — ECT TREATMENT NOTE - NSECTCOMMENTS_PSY_ALL_CORE
Missed 2nd appt this week as she was too tired and didn't want to get out of bed, pt now says "I don't remember why I didn't want to get up, it was weird, but I'm better now".  Reading a book when I enter.  States mood is stable, eating and sleeping ok.  Agrees she has more motivation than before ECT.  Pt informed outpt MD agreed with plan to start to taper to maintenance schedule.  Will have pt return for 2nd appt this week and if stable, taper to weekly.  Missed 2nd appt this week as she was too tired and didn't want to get out of bed, pt now says "I don't remember why I didn't want to get up, it was weird, but I'm better now".  Reading a book when I enter.  States mood is stable, eating and sleeping ok.  Agrees she has more motivation than before ECT.  Pt informed outpt MD agreed with plan to start to taper to maintenance schedule.  Will have pt return for 2nd appt this week and if stable, taper to weekly.     Of note, in tx room, pt asks this MD if I was the one who told her "3 words to remember" (which would have been before starting tx in early Aug).  Pt still able to remember 2 of 3 words given during initial consult.

## 2022-09-19 NOTE — ASU DISCHARGE PLAN (ADULT/PEDIATRIC) - ASU DC SPECIAL INSTRUCTIONSFT
AMBULATORY ELECTROCONVULSIVE THERAPY (ECT) DISCHARGE INSTRUCTIONS    Condition:  Stable for discharge to home.    1. Activities: Rest the day of your treatment.  It is normal to experience drowsiness and possibly some confusion following ECT and anesthesia.  DO NOT consume alcohol, operative machinery, drive or make important personal, business or legal decisions for at least 24 hours.    2. Diet: Begin with a light meal following ECT and progress as tolerated to a regular diet (ie. resume your normal food intake).    3. Medications: Resume medications as prescribed by your outpatient physicians.  Mild aches or headache may be experienced post treatment.  This is usually relieved  by Tylenol or other non-aspirin medications (take only amounts prescribed by each ).      4. Emergencies:  Please first call your regular outpatient psychiatric provider (ie. psychopharmacologist) for any changes in your psychiatric symptoms.   If you are unable to reach your doctor, you go directly to your local Emergency Room.      5. Routine Follow-Up Information:   Please call the ECT Department if you are unable to make your next appointment or have any questions about your ECT treatments.  You may also contact Femi Fuller MD at [email or web address] or [phone] for non-urgent clinical questions about your treatment course.      REMEMBER TO NOT EAT ANYTHING FOR 8 HOURS BEFORE YOUR NEXT TREATMENT.   YOU MAY DRINK ONLY CLEAR LIQUIDS UP TO 3 HOURS BEFORE YOUR TREATMENT (eg. WATER, APPLE JUICE, GINGER ALE).    SOMEONE OVER 18 YEARS OLD MUST ACCOMPANY YOU HOME FROM THE HOSPITAL ON THE DAY OF YOUR TREATMENT.    IF YOU HAVE ANY QUESTIONS ABOUT YOUR NEXT TREATMENT, COVID TESTING, OR EXPIRATION DATES OF YOUR MEDICAL CLEARANCE, PLEASE CONTACT:     ECT Department:  Jamila Armsa[facility] [place] [phone] office; [phone] fax    Your next ECT treatment will be on: Friday 9/23/22

## 2022-09-19 NOTE — ASU DISCHARGE PLAN (ADULT/PEDIATRIC) - NS MD DC FALL RISK RISK
For information on Fall & Injury Prevention, visit: [email or web address]://www.[facility]/news/fall-prevention-protects-and-maintains-health-and-mobility OR  https://www.[facility]/news/fall-prevention-tips-to-avoid-injury OR  https://www.cdc.gov/steadi/patient.html

## 2022-09-23 ENCOUNTER — OUTPATIENT (OUTPATIENT)
Dept: OUTPATIENT SERVICES | Facility: HOSPITAL | Age: 77
LOS: 1 days | Discharge: HOME | End: 2022-09-23

## 2022-09-23 ENCOUNTER — TRANSCRIPTION ENCOUNTER (OUTPATIENT)
Age: 77
End: 2022-09-23

## 2022-09-23 VITALS
OXYGEN SATURATION: 98 % | SYSTOLIC BLOOD PRESSURE: 178 MMHG | TEMPERATURE: 97 F | RESPIRATION RATE: 16 BRPM | WEIGHT: 138.45 LBS | DIASTOLIC BLOOD PRESSURE: 69 MMHG | HEART RATE: 92 BPM | HEIGHT: 69 IN

## 2022-09-23 VITALS
HEART RATE: 92 BPM | SYSTOLIC BLOOD PRESSURE: 178 MMHG | RESPIRATION RATE: 16 BRPM | OXYGEN SATURATION: 98 % | TEMPERATURE: 97 F | DIASTOLIC BLOOD PRESSURE: 69 MMHG

## 2022-09-23 DIAGNOSIS — Z98.890 OTHER SPECIFIED POSTPROCEDURAL STATES: Chronic | ICD-10-CM

## 2022-09-23 NOTE — ASU DISCHARGE PLAN (ADULT/PEDIATRIC) - NS MD DC FALL RISK RISK
For information on Fall & Injury Prevention, visit: https://www.[facility]/news/fall-prevention-protects-and-maintains-health-and-mobility OR  https://www.[facility]/news/fall-prevention-tips-to-avoid-injury OR  https://www.cdc.gov/steadi/patient.html

## 2022-09-23 NOTE — PRE-ANESTHESIA EVALUATION ADULT - NSANTHOSAYNRD_GEN_A_CORE
No. PADMINI screening performed.  STOP BANG Legend: 0-2 = LOW Risk; 3-4 = INTERMEDIATE Risk; 5-8 = HIGH Risk
No. PADMINI screening performed.  STOP BANG Legend: 0-2 = LOW Risk; 3-4 = INTERMEDIATE Risk; 5-8 = HIGH Risk

## 2022-09-23 NOTE — ECT TREATMENT NOTE - NSECTCOMMENTS_PSY_ALL_CORE
Same. Reading a book when I enter.  States mood is stable, eating and sleeping ok.  States that the treatment is helping. In fact, she denied any moments of sadness or loss of interest.  BDI=0    
Same. Reading a book when I enter.  States mood is stable, eating and sleeping ok.  States that the treatment is helping. In fact, she denied any moments of sadness or loss of interest.  BDI=0

## 2022-09-23 NOTE — ECT TREATMENT NOTE - NSECTREVSYS_PSY_ALL_CORE
Cardiovascular/Neurological/Pulmonary/Abdominal/Metabolic/Other
Cardiovascular/Neurological/Pulmonary/Abdominal/Metabolic/Other

## 2022-09-23 NOTE — ECT TREATMENT NOTE - NSSUICPROTFACT_PSY_ALL_CORE
Responsibility to children, family, or others/Identifies reasons for living/Supportive social network of family or friends
Responsibility to children, family, or others/Identifies reasons for living/Supportive social network of family or friends

## 2022-09-23 NOTE — ECT TREATMENT NOTE - NSECTPOSTTXSUMMARY_PSY_ALL_CORE
The patient had a well modified grand mal seizure under general anesthesia and a muscle relaxant.  The patient is alert, responsive, in no acute distress.  Recovery uneventful. 
The patient had a well modified grand mal seizure under general anesthesia and a muscle relaxant.  The patient is alert, responsive, in no acute distress.  Recovery uneventful.

## 2022-09-29 NOTE — ASU PATIENT PROFILE, ADULT - NSICDXPASTMEDICALHX_GEN_ALL_CORE_FT
PAST MEDICAL HISTORY:  Breast CA     Cerebrovascular accident (CVA) 1 year ago    Depressive disorder Depression    HLD (hyperlipidemia)     Hypothyroidism

## 2022-09-29 NOTE — ASU PATIENT PROFILE, ADULT - FALL HARM RISK - UNIVERSAL INTERVENTIONS
Bed in lowest position, wheels locked, appropriate side rails in place/Call bell, personal items and telephone in reach/Instruct patient to call for assistance before getting out of bed or chair/Non-slip footwear when patient is out of bed/[place] to call system/Physically safe environment - no spills, clutter or unnecessary equipment/Purposeful Proactive Rounding/Room/bathroom lighting operational, light cord in reach

## 2022-09-30 ENCOUNTER — OUTPATIENT (OUTPATIENT)
Dept: OUTPATIENT SERVICES | Facility: HOSPITAL | Age: 77
LOS: 1 days | Discharge: HOME | End: 2022-09-30

## 2022-09-30 ENCOUNTER — TRANSCRIPTION ENCOUNTER (OUTPATIENT)
Age: 77
End: 2022-09-30

## 2022-09-30 VITALS
WEIGHT: 138.67 LBS | HEART RATE: 83 BPM | HEIGHT: 69 IN | OXYGEN SATURATION: 100 % | DIASTOLIC BLOOD PRESSURE: 78 MMHG | TEMPERATURE: 98 F | RESPIRATION RATE: 16 BRPM | SYSTOLIC BLOOD PRESSURE: 189 MMHG

## 2022-09-30 VITALS
OXYGEN SATURATION: 99 % | DIASTOLIC BLOOD PRESSURE: 72 MMHG | HEART RATE: 76 BPM | SYSTOLIC BLOOD PRESSURE: 143 MMHG | RESPIRATION RATE: 14 BRPM

## 2022-09-30 DIAGNOSIS — Z98.890 OTHER SPECIFIED POSTPROCEDURAL STATES: Chronic | ICD-10-CM

## 2022-09-30 LAB — SARS-COV-2 RNA SPEC QL NAA+PROBE: NEGATIVE — SIGNIFICANT CHANGE UP

## 2022-09-30 RX ORDER — SODIUM CHLORIDE 9 MG/ML
200 INJECTION, SOLUTION INTRAVENOUS
Refills: 0 | Status: DISCONTINUED | OUTPATIENT
Start: 2022-09-30 | End: 2022-09-30

## 2022-09-30 RX ADMIN — SODIUM CHLORIDE 100 MILLILITER(S): 9 INJECTION, SOLUTION INTRAVENOUS at 11:22

## 2022-09-30 NOTE — ECT TREATMENT NOTE - NSECTPOSTTXSUMMARY_PSY_ALL_CORE
The patient had a well modified grand mal seizure under general anesthesia and a muscle relaxant.  The patient is alert, responsive, in no acute distress.  Recovery uneventful.  Medications

## 2022-09-30 NOTE — ECT TREATMENT NOTE - NSECTCOMMENTS_PSY_ALL_CORE
1st weekly ECT.  Maintenance consent obtained.  Pt states she is doing well.  Went to see  on [place] last weekend and enjoyed herself.  Denies conflict with him or problems coping with him or his medical issues.   No irritability.   Will return in 1 week for f/u to prevent relapse.   BDI=0     1st weekly ECT.  Maintenance consent obtained.  Pt states she is doing well.  Went to see  on [place] last weekend and enjoyed herself.  Denies conflict with him or problems coping with him or his medical issues.   No irritability.   Will return in 1 week for f/u to prevent relapse.   BDI=6

## 2022-09-30 NOTE — ASU DISCHARGE PLAN (ADULT/PEDIATRIC) - NS MD DC FALL RISK RISK
For information on Fall & Injury Prevention, visit: https://www.[facility]/news/fall-prevention-protects-and-maintains-health-and-mobility OR  https://www.[facility]/news/fall-prevention-tips-to-avoid-injury OR  https://www.cdc.gov/steadi/patient.html

## 2022-09-30 NOTE — ASU DISCHARGE PLAN (ADULT/PEDIATRIC) - ASU DC SPECIAL INSTRUCTIONSFT
AMBULATORY ELECTROCONVULSIVE THERAPY (ECT) DISCHARGE INSTRUCTIONS    Condition:  Stable for discharge to home.    1. Activities: Rest the day of your treatment.  It is normal to experience drowsiness and possibly some confusion following ECT and anesthesia.  DO NOT consume alcohol, operative machinery, drive or make important personal, business or legal decisions for at least 24 hours.    2. Diet: Begin with a light meal following ECT and progress as tolerated to a regular diet (ie. resume your normal food intake).    3. Medications: Resume medications as prescribed by your outpatient physicians.  Mild aches or headache may be experienced post treatment.  This is usually relieved  by Tylenol or other non-aspirin medications (take only amounts prescribed by each ).      4. Emergencies:  Please first call your regular outpatient psychiatric provider (ie. psychopharmacologist) for any changes in your psychiatric symptoms.   If you are unable to reach your doctor, you go directly to your local Emergency Room.      5. Routine Follow-Up Information:   Please call the ECT Department if you are unable to make your next appointment or have any questions about your ECT treatments.  You may also contact Femi Fuller MD at [email or web address] or [phone] for non-urgent clinical questions about your treatment course.      REMEMBER TO NOT EAT ANYTHING FOR 8 HOURS BEFORE YOUR NEXT TREATMENT.   YOU MAY DRINK ONLY CLEAR LIQUIDS UP TO 3 HOURS BEFORE YOUR TREATMENT (eg. WATER, APPLE JUICE, GINGER ALE).    SOMEONE OVER 18 YEARS OLD MUST ACCOMPANY YOU HOME FROM THE HOSPITAL ON THE DAY OF YOUR TREATMENT.    IF YOU HAVE ANY QUESTIONS ABOUT YOUR NEXT TREATMENT, COVID TESTING, OR EXPIRATION DATES OF YOUR MEDICAL CLEARANCE, PLEASE CONTACT:     ECT Department:  Jamila Armas[facility] [place] [phone] office; [phone] fax    Your next ECT treatment will be on: Friday 10/7/22 (weekly)

## 2022-09-30 NOTE — ASU PATIENT PROFILE, ADULT - PRESSURE ULCER(S)
No new care gaps identified.  [facility] Embedded Care Gaps. Reference number: [id number]. 9/30/2022   1:31:28 PM CDT   no

## 2022-09-30 NOTE — ASU PREOP CHECKLIST - BSA (M2)
Physician Discharge Summary     Patient ID:  Sary Alegria  [id number]  27 year old  1993    Admit date: 2020    Discharge date and time: 20    Admitting Physician: Alea Jenkins DO     Discharge Physician: Vivian Villar DO    Admission Diagnoses: Pre-eclampsia in third trimester [O14.93]    Discharge Diagnoses: Same    Admission Condition: good    Discharged Condition: good    Indication for Admission:  Chronic hypertension with superimposed preeclampsia with severe features    Hospital Course:  The patient was admitted due to chronic hypertension with need for escalating doses of antihypertensive medication as well as symptoms, meeting criteria for superimposed preeclampsia with severe features.  Betamethasone was administered x2, magnesium started. On hospital day 2 the patient had blood pressure elevation requiring IV antihypertensive and the decision was made to pursue delivery.  She underwent repeat .  Uncomplicated operative course. BP was mild range following delivery, oral labetalol continued.    Disposition: Home    Patient Instructions:   Activity: activity as tolerated and no driving while on analgesics  Diet: resume prior diet  Wound Care: keep wound clean and dry    Follow-up with Dr. Jenkins in 1 week.      
1.77

## 2022-10-06 NOTE — ASU PATIENT PROFILE, ADULT - NS PREOP UNDERSTANDS INFO
Spoke to patient, NPO after 12MN, bring ID photo, insurance and vaccination cards, escort arranged, address and telephone given to patient I/yes

## 2022-10-06 NOTE — ASU PATIENT PROFILE, ADULT - NSICDXPASTMEDICALHX_GEN_ALL_CORE_FT
PAST MEDICAL HISTORY:  Breast CA     Cerebrovascular accident (CVA) 1 year ago    Depressive disorder Depression    HLD (hyperlipidemia)     Hypothyroidism      PAST MEDICAL HISTORY:  Breast CA     CA of skin     Cerebrovascular accident (CVA) 1 year ago    Depressive disorder Depression    HLD (hyperlipidemia)     Hypothyroidism     Murmur pt states its unchanged    RBBB     Smoker

## 2022-10-07 ENCOUNTER — TRANSCRIPTION ENCOUNTER (OUTPATIENT)
Age: 77
End: 2022-10-07

## 2022-10-07 ENCOUNTER — OUTPATIENT (OUTPATIENT)
Dept: OUTPATIENT SERVICES | Facility: HOSPITAL | Age: 77
LOS: 1 days | Discharge: HOME | End: 2022-10-07

## 2022-10-07 VITALS
HEIGHT: 68.5 IN | WEIGHT: 137.57 LBS | SYSTOLIC BLOOD PRESSURE: 187 MMHG | DIASTOLIC BLOOD PRESSURE: 83 MMHG | OXYGEN SATURATION: 99 % | HEART RATE: 91 BPM | TEMPERATURE: 98 F | RESPIRATION RATE: 16 BRPM

## 2022-10-07 VITALS
OXYGEN SATURATION: 96 % | RESPIRATION RATE: 15 BRPM | DIASTOLIC BLOOD PRESSURE: 61 MMHG | HEART RATE: 75 BPM | SYSTOLIC BLOOD PRESSURE: 128 MMHG

## 2022-10-07 DIAGNOSIS — Z98.890 OTHER SPECIFIED POSTPROCEDURAL STATES: Chronic | ICD-10-CM

## 2022-10-07 RX ORDER — ONDANSETRON 8 MG/1
4 TABLET, FILM COATED ORAL ONCE
Refills: 0 | Status: DISCONTINUED | OUTPATIENT
Start: 2022-10-07 | End: 2022-10-07

## 2022-10-07 RX ORDER — ACETAMINOPHEN 500 MG
1000 TABLET ORAL ONCE
Refills: 0 | Status: DISCONTINUED | OUTPATIENT
Start: 2022-10-07 | End: 2022-10-07

## 2022-10-07 RX ORDER — SODIUM CHLORIDE 9 MG/ML
1000 INJECTION, SOLUTION INTRAVENOUS
Refills: 0 | Status: DISCONTINUED | OUTPATIENT
Start: 2022-10-07 | End: 2022-10-07

## 2022-10-07 NOTE — ASU DISCHARGE PLAN (ADULT/PEDIATRIC) - ASU DC SPECIAL INSTRUCTIONSFT
AMBULATORY ELECTROCONVULSIVE THERAPY (ECT) DISCHARGE INSTRUCTIONS    Condition:  Stable for discharge to home.    1. Activities: Rest the day of your treatment.  It is normal to experience drowsiness and possibly some confusion following ECT and anesthesia.  DO NOT consume alcohol, operative machinery, drive or make important personal, business or legal decisions for at least 24 hours.    2. Diet: Begin with a light meal following ECT and progress as tolerated to a regular diet (ie. resume your normal food intake).    3. Medications: Resume medications as prescribed by your outpatient physicians.  Mild aches or headache may be experienced post treatment.  This is usually relieved  by Tylenol or other non-aspirin medications (take only amounts prescribed by each ).      4. Emergencies:  Please first call your regular outpatient psychiatric provider (ie. psychopharmacologist) for any changes in your psychiatric symptoms.   If you are unable to reach your doctor, you go directly to your local Emergency Room.      5. Routine Follow-Up Information:   Please call the ECT Department if you are unable to make your next appointment or have any questions about your ECT treatments.  You may also contact Femi Fuller MD at [email or web address] or [phone] for non-urgent clinical questions about your treatment course.      REMEMBER TO NOT EAT ANYTHING FOR 8 HOURS BEFORE YOUR NEXT TREATMENT.   YOU MAY DRINK ONLY CLEAR LIQUIDS UP TO 3 HOURS BEFORE YOUR TREATMENT (eg. WATER, APPLE JUICE, GINGER ALE).    SOMEONE OVER 18 YEARS OLD MUST ACCOMPANY YOU HOME FROM THE HOSPITAL ON THE DAY OF YOUR TREATMENT.    IF YOU HAVE ANY QUESTIONS ABOUT YOUR NEXT TREATMENT, COVID TESTING, OR EXPIRATION DATES OF YOUR MEDICAL CLEARANCE, PLEASE CONTACT:     ECT Department:  Jamila Armas[facility] [place] [phone] office; [phone] fax    Your next ECT treatment will be on: Friday 10/14/22 (weekly)

## 2022-10-07 NOTE — ASU DISCHARGE PLAN (ADULT/PEDIATRIC) - NS MD DC FALL RISK RISK
For information on Fall & Injury Prevention, visit: https://www.[facility]/news/fall-prevention-protects-and-maintains-health-and-mobility OR  https://www.[facility]/news/fall-prevention-tips-to-avoid-injury OR  https://www.cdc.gov/steadi/patient.html

## 2022-10-07 NOTE — ECT TREATMENT NOTE - NSECTCOMMENTS_PSY_ALL_CORE
2nd weekly ECT.  Maintenance consent confirmed.  Pt states she is doing well.  When asked how she spends her time, replies "reading."  Difficult to engage.  Will continue mECT @ weekly to prevent relapse.

## 2022-10-12 PROBLEM — I45.10 UNSPECIFIED RIGHT BUNDLE-BRANCH BLOCK: Chronic | Status: ACTIVE | Noted: 2022-10-07

## 2022-10-12 PROBLEM — C44.90 UNSPECIFIED MALIGNANT NEOPLASM OF SKIN, UNSPECIFIED: Chronic | Status: ACTIVE | Noted: 2022-10-07

## 2022-10-12 PROBLEM — F17.200 NICOTINE DEPENDENCE, UNSPECIFIED, UNCOMPLICATED: Chronic | Status: ACTIVE | Noted: 2022-10-07

## 2022-10-12 PROBLEM — R01.1 CARDIAC MURMUR, UNSPECIFIED: Chronic | Status: ACTIVE | Noted: 2022-10-07

## 2022-10-13 NOTE — ASU PATIENT PROFILE, ADULT - NSICDXPASTMEDICALHX_GEN_ALL_CORE_FT
PAST MEDICAL HISTORY:  Breast CA     CA of skin     Cerebrovascular accident (CVA) 1 year ago    Depressive disorder Depression    HLD (hyperlipidemia)     Hypothyroidism     Murmur pt states its unchanged    RBBB     Smoker

## 2022-10-13 NOTE — ASU PATIENT PROFILE, ADULT - FALL HARM RISK - UNIVERSAL INTERVENTIONS
Bed in lowest position, wheels locked, appropriate side rails in place/Call bell, personal items and telephone in reach/Instruct patient to call for assistance before getting out of bed or chair/Non-slip footwear when patient is out of bed/[place] to call system/Physically safe environment - no spills, clutter or unnecessary equipment/Purposeful Proactive Rounding/Room/bathroom lighting operational, light cord in reach

## 2022-10-13 NOTE — ASU PATIENT PROFILE, ADULT - TOBACCO USE
[address] [address]  Outpatient Physical Therapy    Treatment Note        Date: 2019  Patient: Kareen Amanda  : 1954  ACCT #: [de-identified]  Referring Practitioner: Donald Truong,  Diagnosis: Dysuria; Myofascial Pain; Pelvic Floor Tension    Visit Information:  PT Visit Information  Onset Date: 19  PT Insurance Information: [facility] CHACHA 35.00 co-pay ( pt states it is 30.00)  Total # of Visits to Date: 2  Plan of Care/Certification Expiration Date: 19  No Show: 0  Canceled Appointment: 1  Progress Note Counter: 2/ Eval Only + 6 visits auth 19 to 19    Subjective: Pt states had to cx last appt d/t UTI; Still has burning and is ging to the bathroom more frequently; also still constipated - states MD told her it is from the Tramadol; Pt brought up conerns re \"swellilng\" through lower abdomen and discussed sxs w/ pt : PT believes it is bloating from GI issues and slow motility and pt stated that sounds like what it is. Comments: RTD 8-15-19;    HEP Compliance:  [x] Good [] Fair [] Poor [] Reports not doing due to:    Vital Signs  Patient Currently in Pain: Yes   Pain Screening  Patient Currently in Pain: Yes  Pain Assessment  Pain Assessment: 0-10  Pain Level: 9  Pain Location: Buttocks  Pain Radiating Towards: To R procimal post thigh N&T -  Pain Descriptors: Pressure; Stabbing    OBJECTIVE:   Exercises  Exercise 2: Prone lying x 4 mins  Exercise 3: NEGRO x 4 mins centralized to mid buttocks  and c/o pain in R ant hip   Exercise 5: relaxation breath *  Exercise 7: PFM - anal winks x 15  Exercise 8: TA isos w/ resisted breath 4-5 s x 15  - tactile and Vc's to eliminate secondary activation of RA and OA w/ pt rtying to complete exhalation w/ good follow through   Exercise 10: PFM- TA iso bridges 10s x 10  Exercise 11: roll for control 10-10-10* x 10  Exercise 13: butterfly stretch*  Exercise 14: edu on practicing relaxation breath and  possiblyl need to work on PF relaxation if pt has had
Current every day smoker

## 2022-10-13 NOTE — ASU PATIENT PROFILE, ADULT - NS PREOP UNDERSTANDS INFO
Spoke to patient , patient need to be NPO after 12MN,, no solid foods , no jewelry, no perfume. bring ID photo insurance and vaccination cards,, Escort arranged ,  will take patient home , address and telephone given to family/yes

## 2022-10-14 ENCOUNTER — TRANSCRIPTION ENCOUNTER (OUTPATIENT)
Age: 77
End: 2022-10-14

## 2022-10-14 ENCOUNTER — OUTPATIENT (OUTPATIENT)
Dept: OUTPATIENT SERVICES | Facility: HOSPITAL | Age: 77
LOS: 1 days | Discharge: HOME | End: 2022-10-14

## 2022-10-14 VITALS
OXYGEN SATURATION: 99 % | SYSTOLIC BLOOD PRESSURE: 182 MMHG | HEART RATE: 88 BPM | HEIGHT: 68 IN | DIASTOLIC BLOOD PRESSURE: 77 MMHG | RESPIRATION RATE: 16 BRPM | TEMPERATURE: 98 F | WEIGHT: 136.69 LBS

## 2022-10-14 VITALS
RESPIRATION RATE: 15 BRPM | HEART RATE: 81 BPM | OXYGEN SATURATION: 98 % | DIASTOLIC BLOOD PRESSURE: 78 MMHG | SYSTOLIC BLOOD PRESSURE: 136 MMHG

## 2022-10-14 DIAGNOSIS — Z98.890 OTHER SPECIFIED POSTPROCEDURAL STATES: Chronic | ICD-10-CM

## 2022-10-14 RX ORDER — EZETIMIBE 10 MG/1
1 TABLET ORAL
Qty: 0 | Refills: 0 | DISCHARGE

## 2022-10-14 RX ORDER — ASPIRIN/CALCIUM CARB/MAGNESIUM 324 MG
1 TABLET ORAL
Qty: 0 | Refills: 0 | DISCHARGE

## 2022-10-14 RX ORDER — ATORVASTATIN CALCIUM 80 MG/1
1 TABLET, FILM COATED ORAL
Qty: 0 | Refills: 0 | DISCHARGE

## 2022-10-14 RX ORDER — SODIUM CHLORIDE 9 MG/ML
250 INJECTION, SOLUTION INTRAVENOUS
Refills: 0 | Status: DISCONTINUED | OUTPATIENT
Start: 2022-10-14 | End: 2022-10-14

## 2022-10-14 RX ORDER — DESVENLAFAXINE 50 MG/1
1 TABLET, EXTENDED RELEASE ORAL
Qty: 0 | Refills: 0 | DISCHARGE

## 2022-10-14 RX ORDER — MIRTAZAPINE 45 MG/1
1 TABLET, ORALLY DISINTEGRATING ORAL
Qty: 0 | Refills: 0 | DISCHARGE

## 2022-10-14 RX ORDER — FLUDROCORTISONE ACETATE 0.1 MG/1
1 TABLET ORAL
Qty: 0 | Refills: 0 | DISCHARGE

## 2022-10-14 RX ORDER — ACETAMINOPHEN 500 MG
650 TABLET ORAL ONCE
Refills: 0 | Status: DISCONTINUED | OUTPATIENT
Start: 2022-10-14 | End: 2022-10-14

## 2022-10-14 RX ORDER — BUPROPION HYDROCHLORIDE 150 MG/1
1 TABLET, EXTENDED RELEASE ORAL
Qty: 0 | Refills: 0 | DISCHARGE

## 2022-10-14 RX ORDER — ONDANSETRON 8 MG/1
0 TABLET, FILM COATED ORAL
Qty: 0 | Refills: 0 | DISCHARGE

## 2022-10-14 RX ORDER — DIAZEPAM 5 MG
1 TABLET ORAL
Qty: 0 | Refills: 0 | DISCHARGE

## 2022-10-14 RX ORDER — AMLODIPINE BESYLATE 2.5 MG/1
1 TABLET ORAL
Qty: 0 | Refills: 0 | DISCHARGE

## 2022-10-14 RX ORDER — ACETAMINOPHEN 500 MG
2 TABLET ORAL
Qty: 0 | Refills: 0 | DISCHARGE
Start: 2022-10-14

## 2022-10-14 RX ORDER — LEVOTHYROXINE SODIUM 125 MCG
1 TABLET ORAL
Qty: 0 | Refills: 0 | DISCHARGE

## 2022-10-14 NOTE — ASU DISCHARGE PLAN (ADULT/PEDIATRIC) - ASU DC SPECIAL INSTRUCTIONSFT
AMBULATORY ELECTROCONVULSIVE THERAPY (ECT) DISCHARGE INSTRUCTIONS    Condition:  Stable for discharge to home.    1. Activities: Rest the day of your treatment.  It is normal to experience drowsiness and possibly some confusion following ECT and anesthesia.  DO NOT consume alcohol, operative machinery, drive or make important personal, business or legal decisions for at least 24 hours.    2. Diet: Begin with a light meal following ECT and progress as tolerated to a regular diet (ie. resume your normal food intake).    3. Medications: Resume medications as prescribed by your outpatient physicians.  Mild aches or headache may be experienced post treatment.  This is usually relieved  by Tylenol or other non-aspirin medications (take only amounts prescribed by each ).      4. Emergencies:  Please first call your regular outpatient psychiatric provider (ie. psychopharmacologist) for any changes in your psychiatric symptoms.   If you are unable to reach your doctor, you go directly to your local Emergency Room.      5. Routine Follow-Up Information:   Please call the ECT Department if you are unable to make your next appointment or have any questions about your ECT treatments.  You may also contact Femi Fuller MD at [email or web address] or [phone] for non-urgent clinical questions about your treatment course.      REMEMBER TO NOT EAT ANYTHING FOR 8 HOURS BEFORE YOUR NEXT TREATMENT.   YOU MAY DRINK ONLY CLEAR LIQUIDS UP TO 3 HOURS BEFORE YOUR TREATMENT (eg. WATER, APPLE JUICE, GINGER ALE).    SOMEONE OVER 18 YEARS OLD MUST ACCOMPANY YOU HOME FROM THE HOSPITAL ON THE DAY OF YOUR TREATMENT.    IF YOU HAVE ANY QUESTIONS ABOUT YOUR NEXT TREATMENT, COVID TESTING, OR EXPIRATION DATES OF YOUR MEDICAL CLEARANCE, PLEASE CONTACT:     ECT Department:  Jamila Armas[facility] [place] [phone] office; [phone] fax    Your next ECT treatment will be on: Friday 10/21/22 (weekly)

## 2022-10-14 NOTE — ASU DISCHARGE PLAN (ADULT/PEDIATRIC) - NS MD DC FALL RISK RISK
For information on Fall & Injury Prevention, visit: [email or web address] OR  [email or web address] OR  https://www.cdc.gov/steadi/patient.html

## 2022-10-14 NOTE — ECT TREATMENT NOTE - NSECTCOMMENTS_PSY_ALL_CORE
3rd weekly ECT.  Maintenance consent confirmed.  Pt states she is doing well and again, when asked how she spends her time, replies "reading."  Difficult to engage, but pleasant.  Will continue mECT @ weekly to prevent relapse.

## 2022-11-02 NOTE — PRE-ANESTHESIA EVALUATION ADULT - HEIGHT IN CM
175.26 Implemented All Universal Safety Interventions:  [place] to call system. Call bell, personal items and telephone within reach. Instruct patient to call for assistance. Room bathroom lighting operational. Non-slip footwear when patient is off stretcher. Physically safe environment: no spills, clutter or unnecessary equipment. Stretcher in lowest position, wheels locked, appropriate side rails in place.

## 2022-11-19 NOTE — PROGRESS NOTE ADULT - PROBLEM SELECTOR PLAN 10
F: tolerating PO, no IVF  E: replete K<4, Mg<2  N: NPO    VTE Prophylaxis: SCD  C: Full Code  D: F F: NS 100cc/hr  E: replete K<4, Mg<2  N: NPO    VTE Prophylaxis: SCD  C: Full Code  D: F 1st Trimester Sonogram/20 Week Level II Sonogram/BioPhysical Profile(s)/Fetal Non-Stress Test (NST)

## 2022-12-29 ENCOUNTER — APPOINTMENT (OUTPATIENT)
Dept: NEUROLOGY | Facility: CLINIC | Age: 77
End: 2022-12-29

## 2023-02-16 ENCOUNTER — APPOINTMENT (OUTPATIENT)
Dept: NEUROLOGY | Facility: CLINIC | Age: 78
End: 2023-02-16
Payer: MEDICARE

## 2023-02-16 VITALS
SYSTOLIC BLOOD PRESSURE: 149 MMHG | OXYGEN SATURATION: 96 % | HEIGHT: 68.5 IN | BODY MASS INDEX: 18.88 KG/M2 | TEMPERATURE: 98.2 F | HEART RATE: 94 BPM | WEIGHT: 126 LBS | DIASTOLIC BLOOD PRESSURE: 73 MMHG

## 2023-02-16 PROCEDURE — 99205 OFFICE O/P NEW HI 60 MIN: CPT

## 2023-02-16 NOTE — HISTORY OF PRESENT ILLNESS
[FreeTextEntry1] : The patient is a very miguelito 77 year old right-handed female with a PMH of HTN, HLD, Hypothyroidism, severe depression (received ECT in past), and prior occipital stroke. She is here to establish care for her history of stroke but also concern from family/friends regarding her mood/memory.\par \par History was obtained from the patient, her home nurse, and her  Lamont who participated in the consultation via phone.\par \par With regard to the patient's stroke, she was found to have an occipital stroke incidentally after undergoing an MRI for headaches.  At that time, she was also found to have an occluded artery and other arterial stenosis, but the details are unclear.  The mechanism of her stroke is unclear. She was recommended aspirin/Plavix and statin but has not been taking her medications.  She denies recurrent stroke symptoms.  She is a former heavy smoker smoking up to 2 packs/day for 10 to 15 years but now only smokes 4 cigarettes/day.  She also is a recovering alcoholic and recently completed rehab.  She no longer drinks any alcohol. She has no family history of stroke.\par \par Perhaps the biggest concern to the patient's  is her short-term memory and mood issues.  He is unsure if symptoms are related more to her severe depression or to a underlying neurologic process.  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  She had previously been very busy with the business and worked with many friends.  Since, many of the friends have moved away and she has secluded herself in her home.  She spends most of her time playing bridge online and reading.  She has no interest in taking her medications and eats minimally.  She also started having falls on occasion because of which she started having a home assistant.  All of her friends and family have expressed concern.  The patient dismisses the symptoms and feels they are largely related to her depression.  Of note, she is on multiple psychiatric medications and also undergoes ECT.

## 2023-02-16 NOTE — ASSESSMENT
[FreeTextEntry1] : The patient is a very miguelito 77 year old right-handed female with a PMH of HTN, HLD, Hypothyroidism, severe depression (received ECT in past), and prior occipital stroke.\par \par With regard to her stroke, based on description, it seems her stroke could be related to large vessel/intracranial atherosclerotic disease. Will obtain updated MRI/MRA head/neck. Will request labs from PCP. For now, she should continue DAPT, statin. She may not need long-term DAPT but we will discuss this after imaging is performed. Encouraged smoking cessation.\par \par With regard to her mood/memory issues, MMSE is normal. Certainly, her mood disorder is contributing to her symptoms but whether or not there is an underlying neurodegenerative process remains uncertain. Plan to obtain neuropsych testing ( will obtain and send prior testing) and obtain PET brain. Labs to be reviewed when received as well. \par \par RTC 2 months.

## 2023-02-16 NOTE — PHYSICAL EXAM
[FreeTextEntry1] : Alert.  Fully oriented. MMSE 29/30 (-1 for delayed recall).  Speech and language are intact.  Cranial nerves II-XII are intact.  Motor exam reveals intact strength with individual muscle testing in bilateral upper and lower extremities.  Tone is normal.  Reflexes are normal.  Toes are downgoing.  Sensation is intact to light touch and vibration in distal extremities.  Finger-to-nose and heel-to-shin are intact.  Rapid alternating movements are normal in the upper and lower extremities.  Gait is normal. She is able to walk on heels, toes, and in tandem without difficulty.

## 2023-02-17 ENCOUNTER — APPOINTMENT (OUTPATIENT)
Dept: NEUROLOGY | Facility: CLINIC | Age: 78
End: 2023-02-17
Payer: MEDICARE

## 2023-02-17 PROCEDURE — 96116 NUBHVL XM PHYS/QHP 1ST HR: CPT

## 2023-02-17 PROCEDURE — 96132 NRPSYC TST EVAL PHYS/QHP 1ST: CPT

## 2023-02-17 PROCEDURE — 96138 PSYCL/NRPSYC TECH 1ST: CPT

## 2023-02-17 PROCEDURE — 96139 PSYCL/NRPSYC TST TECH EA: CPT

## 2023-02-17 PROCEDURE — 96133 NRPSYC TST EVAL PHYS/QHP EA: CPT

## 2023-03-01 ENCOUNTER — APPOINTMENT (OUTPATIENT)
Dept: MRI IMAGING | Facility: HOSPITAL | Age: 78
End: 2023-03-01

## 2023-03-01 ENCOUNTER — APPOINTMENT (OUTPATIENT)
Dept: NUCLEAR MEDICINE | Facility: HOSPITAL | Age: 78
End: 2023-03-01

## 2023-03-01 ENCOUNTER — RESULT REVIEW (OUTPATIENT)
Age: 78
End: 2023-03-01

## 2023-03-01 ENCOUNTER — OUTPATIENT (OUTPATIENT)
Dept: OUTPATIENT SERVICES | Facility: HOSPITAL | Age: 78
LOS: 1 days | End: 2023-03-01
Payer: MEDICARE

## 2023-03-01 DIAGNOSIS — Z98.890 OTHER SPECIFIED POSTPROCEDURAL STATES: Chronic | ICD-10-CM

## 2023-03-01 LAB — GLUCOSE BLDC GLUCOMTR-MCNC: 109 MG/DL — HIGH (ref 70–99)

## 2023-03-01 PROCEDURE — 78608 BRAIN IMAGING (PET): CPT

## 2023-03-01 PROCEDURE — 70551 MRI BRAIN STEM W/O DYE: CPT | Mod: 26,MH

## 2023-03-01 PROCEDURE — 70544 MR ANGIOGRAPHY HEAD W/O DYE: CPT | Mod: 26,MH,59

## 2023-03-01 PROCEDURE — 70544 MR ANGIOGRAPHY HEAD W/O DYE: CPT

## 2023-03-01 PROCEDURE — 82962 GLUCOSE BLOOD TEST: CPT

## 2023-03-01 PROCEDURE — 78999 UNLISTED MISC PX DX NUC MED: CPT

## 2023-03-01 PROCEDURE — 78999 UNLISTED MISC PX DX NUC MED: CPT | Mod: 26,MH

## 2023-03-01 PROCEDURE — 70551 MRI BRAIN STEM W/O DYE: CPT

## 2023-03-01 PROCEDURE — 70547 MR ANGIOGRAPHY NECK W/O DYE: CPT

## 2023-03-01 PROCEDURE — A9552: CPT

## 2023-03-01 PROCEDURE — 78608 BRAIN IMAGING (PET): CPT | Mod: 26,MH

## 2023-03-01 PROCEDURE — 70547 MR ANGIOGRAPHY NECK W/O DYE: CPT | Mod: 26,MH

## 2023-03-08 ENCOUNTER — NON-APPOINTMENT (OUTPATIENT)
Age: 78
End: 2023-03-08

## 2023-03-13 ENCOUNTER — APPOINTMENT (OUTPATIENT)
Dept: NEUROLOGY | Facility: CLINIC | Age: 78
End: 2023-03-13
Payer: MEDICARE

## 2023-03-13 VITALS
BODY MASS INDEX: 19.77 KG/M2 | OXYGEN SATURATION: 96 % | HEART RATE: 87 BPM | SYSTOLIC BLOOD PRESSURE: 160 MMHG | TEMPERATURE: 98 F | HEIGHT: 68.5 IN | DIASTOLIC BLOOD PRESSURE: 77 MMHG | WEIGHT: 132 LBS

## 2023-03-13 DIAGNOSIS — I65.21 OCCLUSION AND STENOSIS OF RIGHT CAROTID ARTERY: ICD-10-CM

## 2023-03-13 DIAGNOSIS — Z86.73 PERSONAL HISTORY OF TRANSIENT ISCHEMIC ATTACK (TIA), AND CEREBRAL INFARCTION W/OUT RESIDUAL DEFICITS: ICD-10-CM

## 2023-03-13 DIAGNOSIS — F39 UNSPECIFIED MOOD [AFFECTIVE] DISORDER: ICD-10-CM

## 2023-03-13 PROCEDURE — 99214 OFFICE O/P EST MOD 30 MIN: CPT

## 2023-03-13 NOTE — ASSESSMENT
[FreeTextEntry1] : The patient is a very miguelito 77 year old right-handed female with a PMH of HTN, HLD, Hypothyroidism, depression (received ECT in past), tobacco dependence, and prior occipital stroke (on aspirin/Plavix, statin).  She is here for follow-up to review recent testing performed for evaluation of cognitive symptoms/mood issues and gait impairment/near-falls. 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 Her clinical symptoms may also be suggestive of this. Further evaluation is warranted to evaluated this further.  Reassuringly, there was no noah evidence of dementia and thus, if NPH evaluations are negative, severe depression may be the cause of her symptoms. \par \par Plan:\par --Will obtain MRI with CSF dynamic study. If positive, will pursue large volume tap (will need antiplatelets held prior)\par --Referral to neurosurgery for NPH eval\par --Continue DAPT, statin for now- will check accumetrics and P2Y12 to determine which antiplatelet she would benefit from for secondary stroke prevention in future\par --Recheck stroke labs\par --Carotid US to determine degree of stenosis and vascular surgery referral\par --Encouraged smoking cessation. \par --Patient wishes to establish care in [facility]- will refer to cardiology/PCP, psych\par \par F/U 1-2 months.

## 2023-03-13 NOTE — HISTORY OF PRESENT ILLNESS
[FreeTextEntry1] : The patient is a very miguelito 77 year old right-handed female with a PMH of HTN, HLD, Hypothyroidism, depression (received ECT in past), tobacco dependence, and prior occipital stroke (on aspirin/Plavix, statin).  She is here for follow-up to review recent testing.\par \par Recent testing reviewed with patient, her sister, and her .  Of note, the patient's family reports persistent gait instability with "freezing" at times and multiple near falls. No urinary incontinence. Mood/cognitive symptoms persist.\par \par Summary: \par \par MRI/MRA brain: Chronic R PCA infarct with associated PCA stenosis/occlusion as well as evidence of L PCA stenosis. Ventriculomegaly with evidence of DESH suggestive of NPH.\par \par MRA neck: Severe R ICA stenosis (known-follows with cardiologist). \par \par PET not consistent with specific dementia- more suggestive of NPH which is also suspected based on MRI. \par \par Neuropysch testing results: some decreased executive functioning/processing speed- possibly consistent with NPH but mood/vascular etiology may also be contributing.

## 2023-03-15 ENCOUNTER — LABORATORY RESULT (OUTPATIENT)
Age: 78
End: 2023-03-15

## 2023-03-15 ENCOUNTER — APPOINTMENT (OUTPATIENT)
Dept: MRI IMAGING | Facility: HOSPITAL | Age: 78
End: 2023-03-15

## 2023-03-15 ENCOUNTER — OUTPATIENT (OUTPATIENT)
Dept: OUTPATIENT SERVICES | Facility: HOSPITAL | Age: 78
LOS: 1 days | End: 2023-03-15
Payer: MEDICARE

## 2023-03-15 ENCOUNTER — APPOINTMENT (OUTPATIENT)
Dept: ULTRASOUND IMAGING | Facility: HOSPITAL | Age: 78
End: 2023-03-15

## 2023-03-15 DIAGNOSIS — Z98.890 OTHER SPECIFIED POSTPROCEDURAL STATES: Chronic | ICD-10-CM

## 2023-03-15 LAB
PA ADP PRP-ACNC: 250 PRU
PLATELET RESPONSE ASPIRIN: 433 ARU

## 2023-03-15 PROCEDURE — 70551 MRI BRAIN STEM W/O DYE: CPT | Mod: 26,MH

## 2023-03-15 PROCEDURE — 93880 EXTRACRANIAL BILAT STUDY: CPT

## 2023-03-15 PROCEDURE — 70551 MRI BRAIN STEM W/O DYE: CPT

## 2023-03-15 PROCEDURE — 93880 EXTRACRANIAL BILAT STUDY: CPT | Mod: 26

## 2023-03-16 LAB
ALBUMIN SERPL ELPH-MCNC: 4.6 G/DL
ALP BLD-CCNC: 109 U/L
ALT SERPL-CCNC: 50 U/L
ANION GAP SERPL CALC-SCNC: 15 MMOL/L
APO LP(A) SERPL-MCNC: 312 NMOL/L
AST SERPL-CCNC: 40 U/L
BASOPHILS # BLD AUTO: 0.05 K/UL
BASOPHILS NFR BLD AUTO: 0.6 %
BILIRUB SERPL-MCNC: 0.2 MG/DL
BUN SERPL-MCNC: 21 MG/DL
CALCIUM SERPL-MCNC: 9.9 MG/DL
CHLORIDE SERPL-SCNC: 102 MMOL/L
CHOLEST SERPL-MCNC: 172 MG/DL
CO2 SERPL-SCNC: 25 MMOL/L
CREAT SERPL-MCNC: 1.63 MG/DL
EGFR: 32 ML/MIN/1.73M2
EOSINOPHIL # BLD AUTO: 0.1 K/UL
EOSINOPHIL NFR BLD AUTO: 1.2 %
ESTIMATED AVERAGE GLUCOSE: 128 MG/DL
GLUCOSE SERPL-MCNC: 108 MG/DL
HBA1C MFR BLD HPLC: 6.1 %
HCT VFR BLD CALC: 40 %
HDLC SERPL-MCNC: 85 MG/DL
HGB BLD-MCNC: 12.2 G/DL
IMM GRANULOCYTES NFR BLD AUTO: 0.2 %
LDLC SERPL CALC-MCNC: 60 MG/DL
LYMPHOCYTES # BLD AUTO: 2.26 K/UL
LYMPHOCYTES NFR BLD AUTO: 26.3 %
MAN DIFF?: NORMAL
MCHC RBC-ENTMCNC: 29.6 PG
MCHC RBC-ENTMCNC: 30.5 GM/DL
MCV RBC AUTO: 97.1 FL
MONOCYTES # BLD AUTO: 0.92 K/UL
MONOCYTES NFR BLD AUTO: 10.7 %
NEUTROPHILS # BLD AUTO: 5.24 K/UL
NEUTROPHILS NFR BLD AUTO: 61 %
NONHDLC SERPL-MCNC: 87 MG/DL
PLATELET # BLD AUTO: 379 K/UL
POTASSIUM SERPL-SCNC: 4.5 MMOL/L
PROT SERPL-MCNC: 7.4 G/DL
RBC # BLD: 4.12 M/UL
RBC # FLD: 12.9 %
SODIUM SERPL-SCNC: 142 MMOL/L
TRIGL SERPL-MCNC: 135 MG/DL
TSH SERPL-ACNC: 6.62 UIU/ML
WBC # FLD AUTO: 8.59 K/UL

## 2023-03-22 ENCOUNTER — APPOINTMENT (OUTPATIENT)
Dept: NEUROSURGERY | Facility: CLINIC | Age: 78
End: 2023-03-22
Payer: MEDICARE

## 2023-03-22 ENCOUNTER — NON-APPOINTMENT (OUTPATIENT)
Age: 78
End: 2023-03-22

## 2023-03-22 VITALS
RESPIRATION RATE: 17 BRPM | DIASTOLIC BLOOD PRESSURE: 73 MMHG | WEIGHT: 134 LBS | HEIGHT: 68 IN | BODY MASS INDEX: 20.31 KG/M2 | TEMPERATURE: 97.2 F | OXYGEN SATURATION: 97 % | HEART RATE: 92 BPM | SYSTOLIC BLOOD PRESSURE: 128 MMHG

## 2023-03-22 DIAGNOSIS — Z87.891 PERSONAL HISTORY OF NICOTINE DEPENDENCE: ICD-10-CM

## 2023-03-22 PROCEDURE — 99204 OFFICE O/P NEW MOD 45 MIN: CPT

## 2023-03-22 NOTE — DATA REVIEWED
[de-identified] : \par \par ACC: [id number] EXAM: NM BRAIN FUSION WITH MR ORDERED BY: MORENO CANTRELL\par \par ACC: [id number] EXAM: MR BRAIN ORDERED BY: MORENO CANTRELL\par \par ACC: [id number] EXAM: PET BRAIN FDG DEMENTIA ORDERED BY: MORENO CANTRELL\par \par PROCEDURE DATE: 03/01/2023\par \par \par \par INTERPRETATION: HISTORY: Evaluate for AD versus FTD. Progressive cognitive decline since 2007 characterized by memory loss and behavioral/personality changes including mood disorder/depression. MMSE score is 29 of 30.\par \par TECHNIQUE: On March 1, 2023 patient was given 7.55 millicuries of FDG intravenously while semisupine in the resting state. Fasting blood sugar measured prior to injection of radiopharmaceutical was 109 mg/dl. Approximately one hour post injection of radiotracer, dedicated PET and CT images of the brain were obtained as per routine protocol. The CT protocol was optimized for PET attenuation correction and to provide anatomic detail for localization of PET abnormalities. 3-D reconstructions and PET-MRI fusions were performed utilizing I Do Now I Don't and reviewed.\par \par Multi-planar multi-sequential MR imaging of the brain was performed without intravenous contrast.\par \par COMPARISON: CT head 1/1/2015.\par \par FINDINGS:\par \par PET FINDINGS:\par \par Visually, there is abnormal FDG distribution pattern with focal markedly decreased/absent radiotracer uptake in the right occipital lobe, corresponding to gliosis/encephalomalacia in the setting of chronic right PCA territory infarct on structural imaging. There is accompanying hypometabolism in the right parieto-occipital region and right sensorimotor cortex. There is mild-to-moderate hypometabolism in the anterior temporal lobes bilaterally. Note, there is preserved relatively normal tracer uptake in the precuneus and posterior cingulate gyri, regions typically involved early in course of Alzheimer's disease. There is apparent marked hypometabolism of the bilateral cingulate gyri, findings favored to represent sequelae of partial volume averaging in the setting of ventriculomegaly. There are also scattered focal areas of apparent severe/absent metabolism, corresponding to prominent cortical sulci on structural imaging. There is hypometabolism in the bilateral striatum, which has been described in the setting of normal pressure hydrocephalus. There is mild hypometabolism in the cerebellum and moderate hypometabolism in the brainstem, findings favored to reflect effects of neurotropic medications.\par \par Semiquantitative analysis using the Z scores calculated in comparison to age-matched normal controls revealed significantly decreased values in the bilateral caudate nuclei, bilateral cingulate gyri, right lingual gyrus, right fusiform gyrus, right occipital lobe, right superior middle and inferior occipital gyri, right primary visual cortex, right cuneus, right precuneus, right medial temporal lobe, right precentral gyrus, middle right retrosplenial area, cerebellar vermis, and right inferior cerebellar peduncle.\par \par \par MRI FINDINGS:\par \par There is gliosis and encephalomalacia in the right mesial occipital lobe, findings consistent with sequelae of chronic right PCA vascular territory infarct. There is associated mild ex-vacuo dilatation of the occipital horn of the right lateral ventricle. There are scattered T2/FLAIR hyperintense changes in the subcortical and periventricular white matter, nonspecific finding, but most likely representing sequelae of mild chronic microvascular ischemic disease. There is a chronic lacunar infarct in the left corona radiata/dorsal putamen.\par \par There is moderate ventriculomegaly which appears out of proportion to the degree of parenchymal volume loss with associated bowing of the corpus callosum, acute callosal angle and asymmetric sulcal prominence with relative crowding of the gyri at the vertex and horizontally oriented, widened Sylvian fissures consistent with DESH, findings suggestive of normal pressure hydrocephalus.\par \par There is cortical sulcal prominence related to brain parenchymal volume loss, which appears particularly pronounced in the mesial temporal regions.\par \par No acute infarction, intracranial hemorrhage or mass.\par \par There are no extra-axial fluid collections. The skull base flow voids are present.\par \par The visualized intraorbital contents are normal. The imaged portions of the paranasal sinuses are aerated. The mastoid air cells are clear. The visualized soft tissues and osseous structures appear unremarkable.\par \par \par IMPRESSION:\par \par Moderate ventriculomegaly with associated functional and structural imaging findings suggestive of normal pressure hydrocephalus. CSF flow study may be helpful for further assessment.\par \par No definite evidence of underlying neurodegenerative disease.\par \par Correlation with clinical exam and neuropsychological assessment is advised.\par \par --- End of Report ---\par \par \par ACC: [id number] EXAM: MR BRAIN W CSF FLOW ORDERED BY: MORENO CANTRELL\par \par PROCEDURE DATE: 03/15/2023\par \par \par \par INTERPRETATION: PROCEDURE: MRI Brain without contrast\par \par INDICATION: Memory difficulty, evaluate for normal pressure hydrocephalus.\par \par TECHNIQUE: Multiplanar multi sequential MR images of the brain were obtained without IV contrast. CSF flow study was performed using velocity encoded sequences.\par \par COMPARISON: MRI brain 3/1/2023\par \par FINDINGS:\par \par Again demonstrated is enlargement of the lateral, third and fourth ventricles out of proportion to the cerebral sulci which is stable from prior exam. There is mild diffuse FLAIR hyperintense signal surrounding the ventricles which may be due to transependymal flow of CSF versus microvascular ischemic changes stable from prior exam. Again demonstrated is hyperintense signal in the level cerebral aqueduct and T2-weighted imaging. CSF flow analysis demonstrates no evidence of obstruction. There is flow through the floor ventricle, prepontine cistern, cerebral aqueduct, fourth ventricle and the cervical medullary junction. At the level the cerebral aqueduct, there is suggestion of hyperdynamic flow as there is jet of reversal flow. These findings can be seen in normal pressure hydrocephalus.\par \par The ventricles are stable in caliber from prior examination.\par \par There is no acute intracranial hemorrhage, extra axial fluid collection, mass effect or midline shift.\par \par Again demonstrated is a chronic right occipital lobe infarction with cortical laminar necrosis. There is no evidence of recent infarction on diffusion-weighted imaging.\par \par The vascular flow voids are maintained. The paranasal sinuses and mastoid air cells are well-aerated.\par \par IMPRESSION:\par \par Again demonstrated is diffuse ventriculomegaly out of proportion to cerebral sulci. There is suggestion of hyperdynamic flow at the level of the cerebral aqueduct which can seen in normal pressure hydrocephalus. No evidence of obstruction.\par \par --- End of Report ---\par

## 2023-03-22 NOTE — ASSESSMENT
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  Neuropsychiatric testing consistent with possible NPH, but also probable underlying neurodegenerative disease.  Carotid Dopplers with significant internal carotid stenosis on the right side.  History of previous right occipital stroke as well.  On aspirin and Plavix.  MRI with hyperdynamic flow through the fourth ventricle consistent with normal pressure hydrocephalus.  I have recommended a lumbar puncture to assist with diagnosis of NPH.  We discussed the potential need for ventriculoperitoneal shunt insertion and the perceived benefits of insertion.  The family will think about the recommendations and let us know if they wish to proceed.\par \par Dr. D' Amico independently reviewed all available images with patient and her sister. \par \par After detailed imaging review and patient examination, Dr. D’Amico discussed lumbar puncture with patient. \par \par Potential surgical risks vs benefits and alternatives discussed with time allotted for questions and answers given. \par Patient verbalizes understanding and wishes to think about it at this time. \par \par PLAN:\par - physical therapy referral for gait training\par - Continue f/u with Dr. Romero\par - Notify if would like to pursue LP\par \par Patient verbalizes understanding of today’s discussion and next steps in treatment plan. \par \par  \par A total of 45 minutes was spent reviewing the labs, imaging and physical examination of the patient. We discussed the diagnosis, and the plan. The patient's questions were answered. The patient demonstrated an excellent understanding of the plan.

## 2023-03-22 NOTE — HISTORY OF PRESENT ILLNESS
[de-identified] : 77 year old right-handed female with a PMHx of HTN, HLD, Hypothyroidism, depression (received ECT in past), tobacco dependence, and prior occipital stroke (on aspirin/Plavix, statin) who presents today as referral from neurologist Dr. Ellie Romero for evaluation of NPH. \par \par - She presented to Dr. Romero for evaluation of short term memory issues, cognitive/ mood issues \par - 3/1/23 Completed MRI/MRA brain which demonstrated Chronic R PCA infarct with associated PCA stenosis/occlusion as well as evidence of L PCA stenosis. Also with ventriculomegaly. \par - 2/17/23 patient underwent cognitive NP testing with Dr. Vale Fish which was overall supportive of possible NPH. \par -Pt was referred to complete MRI with CSF flow and neurosurgical evaluation. \par \par TODAY patient presents for evaluation. She is accompanied by her sister for today's evaluation who helps with HPI/ROS. \par Endorses forgetfulness for a few years that patient did not notice herself. \par Also with gait instability and few falls (last fall about 1.5 months ago). Patient endorses wobbly at times but denies significant balance issue. Sister endorses pt seems off balance at times. \par Denies incontinence. Denies neck pain, weakness of upper/lower extremities. \par Pt quit smoking 1 week ago.\par \par Neurologist: Dr. Ellie Romero\par Neuro- psych: Dr. Vale Fish

## 2023-03-22 NOTE — REVIEW OF SYSTEMS
[As Noted in HPI] : as noted in HPI [Memory Lapses or Loss] : memory loss [Fever] : no fever [Chills] : no chills [Eyesight Problems] : no eyesight problems [Chest Pain] : no chest pain [Palpitations] : no palpitations [Shortness Of Breath] : no shortness of breath [Cough] : no cough [Incontinence] : no incontinence

## 2023-03-22 NOTE — PHYSICAL EXAM
[General Appearance - Alert] : alert [General Appearance - In No Acute Distress] : in no acute distress [Cranial Nerves Optic (II)] : visual acuity intact bilaterally,  pupils equal round and reactive to light [Cranial Nerves Oculomotor (III)] : extraocular motion intact [Cranial Nerves Trigeminal (V)] : facial sensation intact symmetrically [Cranial Nerves Facial (VII)] : face symmetrical [Cranial Nerves Vestibulocochlear (VIII)] : hearing was intact bilaterally [Cranial Nerves Glossopharyngeal (IX)] : tongue and palate midline [Cranial Nerves Accessory (XI - Cranial And Spinal)] : head turning and shoulder shrug symmetric [Cranial Nerves Hypoglossal (XII)] : there was no tongue deviation with protrusion [Motor Tone] : muscle tone was normal in all four extremities [Motor Strength] : muscle strength was normal in all four extremities [Motor Handedness Right-Handed] : the patient is right hand dominant [Sclera] : the sclera and conjunctiva were normal [Neck Appearance] : the appearance of the neck was normal [] : no respiratory distress [Respiration, Rhythm And Depth] : normal respiratory rhythm and effort [Skin Color & Pigmentation] : normal skin color and pigmentation [FreeTextEntry1] : A/O to self, month/year, place

## 2023-03-22 NOTE — REASON FOR VISIT
[New Patient Visit] : a new patient visit [Referred By: _________] : Patient was referred by CONSTANTINO [Family Member] : family member

## 2023-03-27 DIAGNOSIS — G93.89 OTHER SPECIFIED DISORDERS OF BRAIN: ICD-10-CM

## 2023-03-27 DIAGNOSIS — R41.3 OTHER AMNESIA: ICD-10-CM

## 2023-03-27 DIAGNOSIS — I65.23 OCCLUSION AND STENOSIS OF BILATERAL CAROTID ARTERIES: ICD-10-CM

## 2023-04-13 NOTE — ASU PATIENT PROFILE, ADULT - NS PREOP UNDERSTANDS INFO
No solid food, dairy, candy or gum after midnight, water is allowed before 8:00AM tomorrow, bring photo ID, vaccination and insurance card. Escort must show proof of vaccination and photo ID. No jewelries; contact lense; dress in comfortable clothes. No smoking/alcohol drinking/illicit drug use tonight. Address and call back number given./yes Calcipotriene Pregnancy And Lactation Text: This medication has not been proven safe during pregnancy. It is unknown if this medication is excreted in breast milk.

## 2023-04-14 ENCOUNTER — APPOINTMENT (OUTPATIENT)
Dept: NEUROLOGY | Facility: CLINIC | Age: 78
End: 2023-04-14
Payer: MEDICARE

## 2023-04-14 VITALS
RESPIRATION RATE: 17 BRPM | HEART RATE: 96 BPM | DIASTOLIC BLOOD PRESSURE: 71 MMHG | OXYGEN SATURATION: 95 % | SYSTOLIC BLOOD PRESSURE: 125 MMHG | HEIGHT: 68 IN | TEMPERATURE: 97.8 F | WEIGHT: 138 LBS | BODY MASS INDEX: 20.92 KG/M2

## 2023-04-14 PROCEDURE — 99213 OFFICE O/P EST LOW 20 MIN: CPT

## 2023-04-14 NOTE — ASSESSMENT
[FreeTextEntry1] : The patient is a very miguelito 77 year old right-handed female with a PMH of HTN, HLD, Hypothyroidism, depression (received ECT in past), tobacco dependence, R ICA stenosis, and prior occipital stroke in s/o severe PCA stenosis (on aspirin/Plavix, statin), and imaging suggestive of NPH.  \par \par It remains unclear how much of patient's symptoms (mood issues/memory decline) are related to NPH vs underlying psychiatric disease. We discussed that LP may offer some improvement but that the mood/memory problems are less likely to improve than gait issues which are less of an issue for her. She understands this but also understands that without performing LP, we do not know if there is a chance for improvement of her some of her symptoms. After discussion, patient is open to LP for further assessment.  Will relay to neurosurgery. Patient's , Lamont, requests to also be contacted at [phone] or [email or web address]. RTC 2 months

## 2023-04-14 NOTE — HISTORY OF PRESENT ILLNESS
[FreeTextEntry1] : The patient is a very miguelito 77 year old right-handed female with a PMH of HTN, HLD, Hypothyroidism, depression (received ECT in past), tobacco dependence, R ICA stenosis, and prior occipital stroke in s/o severe PCA stenosis (on aspirin/Plavix, statin), and imaging suggestive of NPH. She is here for follow-up to discuss LP as part of NPH eval.\par \par Since our last visit, the patient has been well. She was seen by Dr. D'Amico. She was initially hesitant to pursue large volume LP to assess for NPH but is now open to the idea. She denies any new symptoms/complaints. She has f/u with cardiology next week. She has not yet schedule vascular surgery appt for carotid stenosis.

## 2023-04-17 NOTE — DISCUSSION/SUMMARY
[EKG obtained to assist in diagnosis and management of assessed problem(s)] : EKG obtained to assist in diagnosis and management of assessed problem(s) [FreeTextEntry1] : The patient is a very miguelito 77 year old right-handed female with a PMH of HTN, HLD, Hypothyroidism, depression (received ECT in past), tobacco dependence, R ICA stenosis, and prior occipital stroke in s/o severe PCA stenosis (on aspirin/Plavix, statin), and imaging suggestive of NPH. She is here fto establish cardiology care\par \par

## 2023-04-17 NOTE — HISTORY OF PRESENT ILLNESS
[FreeTextEntry1] : The patient is a very miguelito 77 year old right-handed female with a PMH of HTN, HLD, Hypothyroidism, depression (received ECT in past), tobacco dependence, R ICA stenosis, and prior occipital stroke in s/o severe PCA stenosis (on aspirin/Plavix, statin), and imaging suggestive of NPH. She is here fto establish cardiology care\par \par Since our last visit, the patient has been well. She was seen by Dr. D'Amico. She was initially hesitant to pursue large volume LP to assess for NPH but is now open to the idea. She denies any new symptoms/complaints. She has f/u with cardiology next week. She has not yet schedule vascular surgery appt for carotid stenosis.

## 2023-04-18 ENCOUNTER — APPOINTMENT (OUTPATIENT)
Dept: HEART AND VASCULAR | Facility: CLINIC | Age: 78
End: 2023-04-18

## 2023-05-04 ENCOUNTER — APPOINTMENT (OUTPATIENT)
Dept: NEUROSURGERY | Facility: HOSPITAL | Age: 78
End: 2023-05-04

## 2023-05-04 ENCOUNTER — OUTPATIENT (OUTPATIENT)
Dept: OUTPATIENT SERVICES | Facility: HOSPITAL | Age: 78
LOS: 1 days | Discharge: HOME | End: 2023-05-04
Payer: MEDICARE

## 2023-05-04 DIAGNOSIS — Z98.890 OTHER SPECIFIED POSTPROCEDURAL STATES: Chronic | ICD-10-CM

## 2023-05-04 PROCEDURE — 62270 DX LMBR SPI PNXR: CPT

## 2023-05-04 PROCEDURE — 62272 THER SPI PNXR DRG CSF: CPT

## 2023-05-04 RX ORDER — SODIUM CHLORIDE 9 MG/ML
1000 INJECTION INTRAMUSCULAR; INTRAVENOUS; SUBCUTANEOUS
Refills: 0 | Status: DISCONTINUED | OUTPATIENT
Start: 2023-05-04 | End: 2023-05-18

## 2023-05-04 RX ORDER — CHLORHEXIDINE GLUCONATE 213 G/1000ML
1 SOLUTION TOPICAL ONCE
Refills: 0 | Status: DISCONTINUED | OUTPATIENT
Start: 2023-05-04 | End: 2023-05-18

## 2023-05-04 NOTE — PROCEDURE NOTE - NSOPENPRESSURE_GEN_A_CORE
[FreeTextEntry1] : 38 year-old male with PMHx of nummular eczema, recurrent GERD, H. pylori gastritis s/p eradication, chronic LUQ pain, epigastric swelling, asymptomatic COVID-19 infection, DLD. \par \par # headache with red-flag signs\par - MRI was unremarkable and showed incidental small arachnoids cysts\par - follow up with neurology\par \par # GERD\par - refractory to PPI. \par - Patient will need pH manometry however patient is pending stress test for cardiology clearance.\par \par #Anxiety\par - possible psycho somatization given his symptoms\par - Psych referral given \par \par # chest pressure - likely due to GERD\par - however will need stress test/echo as per cardio\par \par # DLD\par - life style modification \par \par RTC after follow up w/ neurology or PRN\par \par \par \par # nummular eczema\par - stable\par \par f/u in 1 month and prn\par \par \par   Measurement of opening pressure performed maintaining proper positioning of patient.

## 2023-05-04 NOTE — PROGRESS NOTE ADULT - SUBJECTIVE AND OBJECTIVE BOX
Procedure: High volume lumbar puncture  Doctor: Dr. D'amico  Consent: Signed by: patient                   NAME/NUMBER if not patient:     SUBJECTIVE:  77 year old right-handed female with a PMHx of HTN, HLD, Hypothyroidism, depression (received ECT in past), tobacco dependence, and prior occipital stroke (on aspirin/Plavix, statin) who presents today as referral from neurologist Dr. Ellie Romero for evaluation of NPH.     - She presented to Dr. Romero for evaluation of short term memory issues, cognitive/ mood issues   - 3/1/23 Completed MRI/MRA brain which demonstrated Chronic R PCA infarct with associated PCA stenosis/occlusion as well as evidence of L PCA stenosis. Also with ventriculomegaly.   - 2/17/23 patient underwent cognitive NP testing with Dr. Vale Fish which was overall supportive of possible NPH.   -Pt was referred to complete MRI with CSF flow and neurosurgical evaluation.     TODAY patient presents for evaluation. She is accompanied by her sister for today's evaluation who helps with HPI/ROS.   Endorses forgetfulness for a few years that patient did not notice herself.   Also with gait instability and few falls (last fall about 1.5 months ago). Patient endorses wobbly at times but denies significant balance issue. Sister endorses pt seems off balance at times.   Denies incontinence. Denies neck pain, weakness of upper/lower extremities.   Pt quit smoking 1 week ago.    PMHx: as above  PSHx: n/a  Social Hx: Pt quit smoking 1 week ago.  Medications:   Allergies: NKDA    T(C): --  HR: --  BP: --  RR: --  SpO2: --  Wt(kg): --    EXAM:  Neurological: AOx3, NAD, FC, speech coherent   CN II-XII: EOMI, PERRL, face symmetric, tongue midline   Motor: MAEx4 5/5 UE and LE B/L   SILT throughout  WWP throughout   No pronator drift   Cardiovascular: normal rate and rhythm  Respiratory: unlabored breathing on RA   Gastrointestinal: abd soft, NT, ND   Extremities: no LE edema, DP pulses intact                Pregnancy test (serum hcg for any female under 56, must be resulted day before OR): [ ] Negative Result  [ ] Positive Result  [ X] N/A : male or female>57 y/o      COVID swab (in past 72hrs): n/a    CXR: normal  EKG: nst  ECHO if available: n/a  Medical Clearances: see paper chart    Heparin drip:               If yes --> Needs to be held 2 hours prior to angiogram, order placed: []yes   []no, primary team aware []yes   []no   [X]n/a    Contrast allergy: [] yes   [X] no  If yes --> premedication ordered []y []n    Implanted Devices (pacemaker, drug pump...etc):  []YES   [X] NO                  If yes --> EPS consulted to interrogate device: [ ] YES  [ ] NO                            If yes -->  EPS called to let them know patient going for procedure: [ ] device needs to be turned off                                                                                                                                                 [ ] magnet needs to be placed for surgery                                                                                                                                                [ ] nothing to do per EP, may proceed with cautery use in OR                                       Assessment:  77 year old right-handed female with a PMHx of HTN, HLD, Hypothyroidism, depression (received ECT in past), tobacco dependence, and prior occipital stroke (on aspirin/Plavix, statin) who presents for high volume LP      Plan:    - Consent for high volume LP obtained and in chart, all risks, benefits and alternatives discussed including risk of bleeding at access site, infection, spinal headache, stroke, vessel dissection  - NPO/IVF  - Return to cath lab recovery post procedure     Assessment:  Present when checked    []  GCS  E   V  M     Heart Failure: []Acute, [] acute on chronic , []chronic  Heart Failure:  [] Diastolic (HFpEF), [] Systolic (HFrEF), []Combined (HFpEF and HFrEF), [] RHF, [] Pulm HTN, [] Other    [] ALYSHA, [] ATN, [] AIN, [] other  [] CKD1, [] CKD2, [] CKD 3, [] CKD 4, [] CKD 5, []ESRD    Encephalopathy: [] Metabolic, [] Hepatic, [] toxic, [] Neurological, [] Other    Abnormal Nurtitional Status: [] malnurtition (see nutrition note), [ ]underweight: BMI < 19, [] morbid obesity: BMI >40, [] Cachexia    [] Sepsis  [] hypovolemic shock,[] cardiogenic shock, [] hemorrhagic shock, [] neuogenic shock  [] Acute Respiratory Failure  []Cerebral edema, [] Brain compression/ herniation,   [] Functional quadriplegia  [] Acute blood loss anemia   Procedure: High volume lumbar puncture  Doctor: Dr. D'amico  Consent: Signed by: patient                   NAME/NUMBER if not patient:     SUBJECTIVE:  77 year old right-handed female with a PMHx of HTN, HLD, Hypothyroidism, depression (received ECT in past), tobacco dependence, and prior occipital stroke (on aspirin/Plavix, statin) who presents today as referral from neurologist Dr. Ellie Romero for evaluation of NPH.     - She presented to Dr. Romero for evaluation of short term memory issues, cognitive/ mood issues   - 3/1/23 Completed MRI/MRA brain which demonstrated Chronic R PCA infarct with associated PCA stenosis/occlusion as well as evidence of L PCA stenosis. Also with ventriculomegaly.   - 2/17/23 patient underwent cognitive NP testing with Dr. Vale Fish which was overall supportive of possible NPH.   -Pt was referred to complete MRI with CSF flow and neurosurgical evaluation.     Endorses forgetfulness for a few years that patient did not notice herself.   Also with gait instability and few falls (last fall about 1.5 months ago). Patient endorses wobbly at times but denies significant balance issue. Sister endorses pt seems off balance at times.   Denies incontinence. Denies neck pain, weakness of upper/lower extremities.   Pt quit smoking 1 week ago.    PMHx: as above  PSHx: n/a  Social Hx: Pt quit smoking 1 week ago.  Medications: n/a  Allergies: NKDA    T(C): --  HR: --  BP: --  RR: --  SpO2: --  Wt(kg): --    EXAM:  Neurological: AOx3, NAD, FC, speech coherent   CN II-XII: EOMI, PERRL, face symmetric, tongue midline   Motor: MAEx4 5/5 UE and LE B/L   SILT throughout  WWP throughout   No pronator drift   Cardiovascular: normal rate and rhythm  Respiratory: unlabored breathing on RA   Gastrointestinal: abd soft, NT, ND   Extremities: no LE edema, DP pulses intact                Pregnancy test (serum hcg for any female under 56, must be resulted day before OR): [ ] Negative Result  [ ] Positive Result  [ X] N/A : male or female>57 y/o      COVID swab (in past 72hrs): n/a    CXR: normal  EKG: nst  ECHO if available: n/a  Medical Clearances: see paper chart    Heparin drip:               If yes --> Needs to be held 2 hours prior to angiogram, order placed: []yes   []no, primary team aware []yes   []no   [X]n/a    Contrast allergy: [] yes   [X] no  If yes --> premedication ordered []y []n    Implanted Devices (pacemaker, drug pump...etc):  []YES   [X] NO                  If yes --> EPS consulted to interrogate device: [ ] YES  [ ] NO                            If yes -->  EPS called to let them know patient going for procedure: [ ] device needs to be turned off                                                                                                                                                 [ ] magnet needs to be placed for surgery                                                                                                                                                [ ] nothing to do per EP, may proceed with cautery use in OR                                       Assessment:  77 year old right-handed female with a PMHx of HTN, HLD, Hypothyroidism, depression (received ECT in past), tobacco dependence, and prior occipital stroke (on aspirin/Plavix, statin) who presents for high volume Lumbar puncture      Plan:    - Consent for high volume Lumbar Puncture obtained and in chart, all risks, benefits and alternatives discussed including risk of bleeding at access site, infection, spinal headache, stroke, vessel dissection  - NPO/IVF  - Return to cath lab recovery post procedure     Assessment:  Present when checked    []  GCS  E   V  M     Heart Failure: []Acute, [] acute on chronic , []chronic  Heart Failure:  [] Diastolic (HFpEF), [] Systolic (HFrEF), []Combined (HFpEF and HFrEF), [] RHF, [] Pulm HTN, [] Other    [] ALYSHA, [] ATN, [] AIN, [] other  [] CKD1, [] CKD2, [] CKD 3, [] CKD 4, [] CKD 5, []ESRD    Encephalopathy: [] Metabolic, [] Hepatic, [] toxic, [] Neurological, [] Other    Abnormal Nurtitional Status: [] malnurtition (see nutrition note), [ ]underweight: BMI < 19, [] morbid obesity: BMI >40, [] Cachexia    [] Sepsis  [] hypovolemic shock,[] cardiogenic shock, [] hemorrhagic shock, [] neuogenic shock  [] Acute Respiratory Failure  []Cerebral edema, [] Brain compression/ herniation,   [] Functional quadriplegia  [] Acute blood loss anemia

## 2023-05-04 NOTE — PROCEDURE NOTE - ADDITIONAL PROCEDURE DETAILS
Pt positioned knee to chest, site prepped in sterile fashion, local lidocaine injected, lumbar region punctured w/ clear CSF drainage, 30 cc CSF drained, site closed w/ bandaid, no complications.

## 2023-05-05 PROCEDURE — 62270 DX LMBR SPI PNXR: CPT

## 2023-05-07 PROBLEM — Z98.890: Status: ACTIVE | Noted: 2023-05-07

## 2023-05-07 PROBLEM — G91.9 HYDROCEPHALUS IN ADULT: Status: ACTIVE | Noted: 2023-03-21

## 2023-05-07 PROBLEM — R41.3 MEMORY DIFFICULTY: Status: ACTIVE | Noted: 2023-02-16

## 2023-05-07 PROBLEM — R26.81 GAIT INSTABILITY: Status: ACTIVE | Noted: 2023-03-13

## 2023-05-09 DIAGNOSIS — F17.210 NICOTINE DEPENDENCE, CIGARETTES, UNCOMPLICATED: ICD-10-CM

## 2023-05-09 DIAGNOSIS — G91.2 (IDIOPATHIC) NORMAL PRESSURE HYDROCEPHALUS: ICD-10-CM

## 2023-05-09 DIAGNOSIS — E78.5 HYPERLIPIDEMIA, UNSPECIFIED: ICD-10-CM

## 2023-05-09 DIAGNOSIS — Z79.82 LONG TERM (CURRENT) USE OF ASPIRIN: ICD-10-CM

## 2023-05-09 DIAGNOSIS — I10 ESSENTIAL (PRIMARY) HYPERTENSION: ICD-10-CM

## 2023-05-09 DIAGNOSIS — E03.9 HYPOTHYROIDISM, UNSPECIFIED: ICD-10-CM

## 2023-05-09 DIAGNOSIS — Z79.02 LONG TERM (CURRENT) USE OF ANTITHROMBOTICS/ANTIPLATELETS: ICD-10-CM

## 2023-05-09 DIAGNOSIS — F32.9 MAJOR DEPRESSIVE DISORDER, SINGLE EPISODE, UNSPECIFIED: ICD-10-CM

## 2023-05-10 ENCOUNTER — APPOINTMENT (OUTPATIENT)
Dept: NEUROSURGERY | Facility: CLINIC | Age: 78
End: 2023-05-10
Payer: MEDICARE

## 2023-05-10 ENCOUNTER — NON-APPOINTMENT (OUTPATIENT)
Age: 78
End: 2023-05-10

## 2023-05-10 VITALS
HEIGHT: 68 IN | TEMPERATURE: 95 F | DIASTOLIC BLOOD PRESSURE: 68 MMHG | OXYGEN SATURATION: 97 % | HEART RATE: 91 BPM | SYSTOLIC BLOOD PRESSURE: 142 MMHG | RESPIRATION RATE: 17 BRPM | BODY MASS INDEX: 21.67 KG/M2 | WEIGHT: 143 LBS

## 2023-05-10 DIAGNOSIS — R41.3 OTHER AMNESIA: ICD-10-CM

## 2023-05-10 DIAGNOSIS — R26.81 UNSTEADINESS ON FEET: ICD-10-CM

## 2023-05-10 DIAGNOSIS — G91.9 HYDROCEPHALUS, UNSPECIFIED: ICD-10-CM

## 2023-05-10 DIAGNOSIS — Z98.890 OTHER SPECIFIED POSTPROCEDURAL STATES: ICD-10-CM

## 2023-05-10 PROCEDURE — 99212 OFFICE O/P EST SF 10 MIN: CPT

## 2023-05-10 NOTE — PHYSICAL EXAM
[General Appearance - Alert] : alert [General Appearance - In No Acute Distress] : in no acute distress [Person] : oriented to person [Place] : oriented to place [Motor Tone] : muscle tone was normal in all four extremities [Motor Strength] : muscle strength was normal in all four extremities [Sclera] : the sclera and conjunctiva were normal [Neck Appearance] : the appearance of the neck was normal [] : no respiratory distress [Respiration, Rhythm And Depth] : normal respiratory rhythm and effort [Skin Color & Pigmentation] : normal skin color and pigmentation [Time] : disoriented to time

## 2023-05-10 NOTE — ASSESSMENT
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  Neuropsychiatric testing consistent with possible NPH, but also probable underlying neurodegenerative disease.  Carotid Dopplers with significant internal carotid stenosis on the right side.  History of previous right occipital stroke as well.  On aspirin and Plavix.  MRI with hyperdynamic flow through the fourth ventricle consistent with normal pressure hydrocephalus.  Patient recently underwent high-volume lumbar puncture without any clinical benefit.  We discussed that at this point I would not recommend proceeding with ventriculoperitoneal shunt insertion given the risk-benefit ratio is unclear in her condition.  I recommended that she continue to follow-up with neurology regarding her cognitive issues.\par \par Dr. D' Amico independently reviewed all available images with patient. \par \par PLAN: \par - Continue f/u with neurologist Dr. Romero \par - RTC prn\par \par Patient verbalizes understanding of today’s discussion and next steps in treatment plan. \par \par \par A total of 15 minutes was spent reviewing the labs, imaging and physical examination of the patient. We discussed the diagnosis, and the plan. The patient's questions were answered. The patient demonstrated an excellent understanding of the plan.

## 2023-05-10 NOTE — DATA REVIEWED
[de-identified] : \par \par \par \par ACC: [id number] EXAM: MR BRAIN W CSF FLOW ORDERED BY: MORENO CANTRELL\par \par PROCEDURE DATE: 03/15/2023\par \par \par \par INTERPRETATION: PROCEDURE: MRI Brain without contrast\par \par INDICATION: Memory difficulty, evaluate for normal pressure hydrocephalus.\par \par TECHNIQUE: Multiplanar multi sequential MR images of the brain were obtained without IV contrast. CSF flow study was performed using velocity encoded sequences.\par \par COMPARISON: MRI brain 3/1/2023\par \par FINDINGS:\par \par Again demonstrated is enlargement of the lateral, third and fourth ventricles out of proportion to the cerebral sulci which is stable from prior exam. There is mild diffuse FLAIR hyperintense signal surrounding the ventricles which may be due to transependymal flow of CSF versus microvascular ischemic changes stable from prior exam. Again demonstrated is hyperintense signal in the level cerebral aqueduct and T2-weighted imaging. CSF flow analysis demonstrates no evidence of obstruction. There is flow through the floor ventricle, prepontine cistern, cerebral aqueduct, fourth ventricle and the cervical medullary junction. At the level the cerebral aqueduct, there is suggestion of hyperdynamic flow as there is jet of reversal flow. These findings can be seen in normal pressure hydrocephalus.\par \par The ventricles are stable in caliber from prior examination.\par \par There is no acute intracranial hemorrhage, extra axial fluid collection, mass effect or midline shift.\par \par Again demonstrated is a chronic right occipital lobe infarction with cortical laminar necrosis. There is no evidence of recent infarction on diffusion-weighted imaging.\par \par The vascular flow voids are maintained. The paranasal sinuses and mastoid air cells are well-aerated.\par \par IMPRESSION:\par \par Again demonstrated is diffuse ventriculomegaly out of proportion to cerebral sulci. There is suggestion of hyperdynamic flow at the level of the cerebral aqueduct which can seen in normal pressure hydrocephalus. No evidence of obstruction.\par \par --- End of Report ---\par \par \par

## 2023-05-10 NOTE — REVIEW OF SYSTEMS
[As Noted in HPI] : as noted in HPI [Fever] : no fever [Chills] : no chills [Chest Pain] : no chest pain [Palpitations] : no palpitations [Shortness Of Breath] : no shortness of breath [Cough] : no cough [Incontinence] : no incontinence

## 2023-05-10 NOTE — HISTORY OF PRESENT ILLNESS
[FreeTextEntry1] : 77 year old right-handed female with a PMHx of HTN, HLD, Hypothyroidism, depression (received ECT in past), tobacco dependence, and prior occipital stroke (on aspirin/Plavix, statin) who presented as referral from neurologist Dr. Ellie Romero for evaluation of NPH. \par \par - She presented to Dr. Romero for evaluation of short term memory issues, cognitive/ mood issues \par - 3/1/23 Completed MRI/MRA brain which demonstrated Chronic R PCA infarct with associated PCA stenosis/occlusion as well as evidence of L PCA stenosis. Also with ventriculomegaly. \par - 2/17/23 patient underwent cognitive NP testing with Dr. Vale Fish which was overall supportive of possible NPH. \par -Pt was referred to complete MRI with CSF flow and neurosurgical evaluation. \par \par 3/22/23 initial evaluation: \par Endorses forgetfulness for a few years that patient does not notice herself. Also with gait instability and few falls (last fall about 1.5 months ago). She is wobbly at times but denies significant balance issue. \par Denies incontinence. Denies neck pain, weakness of upper/lower extremities. \par Pt quit smoking 1 week ago.\par \par 5/4/23 patient underwent high volume LP\par Opening pressure: 17; 30cc CSF removed\par \par TODAY patient returns for follow- up with her . Denies any changes post LP.  notes maybe she seemed "more present and lighter" for about a day or two. \par \par \par Neurologist: Dr. Ellie Romero\par

## 2023-07-07 ENCOUNTER — APPOINTMENT (OUTPATIENT)
Dept: NEUROLOGY | Facility: CLINIC | Age: 78
End: 2023-07-07

## 2023-08-11 NOTE — ECT TREATMENT NOTE - NSECTMACHPARA1ST_PSY_ALL_CORE
Called [facility] to follow up on hospital discharge.  She states they are going to stop warfarin today.  She has spoken with Antonette in cardiology and this is what they have decided.  Writer verified with Antonette that she has given OK to stop warfarin.  Will inactivate Eliseo from the [facility].  Krysta Mcdaniel RN    
MECTA

## 2023-08-15 ENCOUNTER — APPOINTMENT (OUTPATIENT)
Dept: NEUROLOGY | Facility: CLINIC | Age: 78
End: 2023-08-15

## 2024-09-03 NOTE — ED ADULT TRIAGE NOTE - RESPIRATORY RATE (BREATHS/MIN)
Please Send for [facility]    Pharmacy requesting refill of Gabapentin 300 MG.      Medication active on med list yes      Date of last Rx: 04/30/2024 #90 with 3 refills          verified by ADARSH KEEN      Date of last appointment 04/30/2024    Next Visit Date:  Visit date not found       16

## 2024-11-19 NOTE — ASU PREOP CHECKLIST - NOTHING BY MOUTH SINCE
Patient feeling much better since the Reglan. Stated she no longer needed the phenergan. Educated pt and family on  2 prescriptions and discharge paperwork as well as follow-up appointment.  Pt and family verbalizes understanding of all instructions and denies questions.  Pt left ambulatory by self with all personal belongings, prescriptions x2, discharge paperwork, and school note to private residence.  Pt in no distress at time of discharge.     09-Sep-2022 07:00

## 2024-12-12 NOTE — DISCHARGE NOTE NURSING/CASE MANAGEMENT/SOCIAL WORK - NURSING SECTION COMPLETE
Plan: Informed that patch testing with an allergist may be beneficial.
Initiate Treatment: Cetaphil Rough and Bumpy or CeraVe SA cream
Continue Regimen: Betamethasone, Augmented 0.05%
Detail Level: Zone
Patient/Caregiver provided printed discharge information.
Render In Strict Bullet Format?: No

## 2025-04-23 NOTE — ECT TREATMENT NOTE - NSECTMECTACHARGE1STIM_PSY_ALL_CORE
Report received from dayshift [id number]